# Patient Record
Sex: MALE | Race: OTHER | NOT HISPANIC OR LATINO
[De-identification: names, ages, dates, MRNs, and addresses within clinical notes are randomized per-mention and may not be internally consistent; named-entity substitution may affect disease eponyms.]

---

## 2017-11-29 PROBLEM — Z00.00 ENCOUNTER FOR PREVENTIVE HEALTH EXAMINATION: Status: ACTIVE | Noted: 2017-11-29

## 2018-10-03 ENCOUNTER — APPOINTMENT (OUTPATIENT)
Dept: ENDOCRINOLOGY | Facility: CLINIC | Age: 63
End: 2018-10-03

## 2020-05-31 ENCOUNTER — EMERGENCY (EMERGENCY)
Facility: HOSPITAL | Age: 65
LOS: 1 days | Discharge: HOME | End: 2020-05-31
Attending: EMERGENCY MEDICINE
Payer: COMMERCIAL

## 2020-05-31 VITALS
OXYGEN SATURATION: 98 % | RESPIRATION RATE: 18 BRPM | HEART RATE: 64 BPM | TEMPERATURE: 98 F | DIASTOLIC BLOOD PRESSURE: 84 MMHG | SYSTOLIC BLOOD PRESSURE: 165 MMHG | HEIGHT: 65 IN | WEIGHT: 179.9 LBS

## 2020-05-31 PROCEDURE — 99284 EMERGENCY DEPT VISIT MOD MDM: CPT

## 2020-05-31 RX ORDER — KETOROLAC TROMETHAMINE 30 MG/ML
30 SYRINGE (ML) INJECTION ONCE
Refills: 0 | Status: DISCONTINUED | OUTPATIENT
Start: 2020-05-31 | End: 2020-05-31

## 2020-05-31 RX ORDER — LIDOCAINE 4 G/100G
1 CREAM TOPICAL ONCE
Refills: 0 | Status: COMPLETED | OUTPATIENT
Start: 2020-05-31 | End: 2020-05-31

## 2020-05-31 RX ORDER — DIAZEPAM 5 MG
5 TABLET ORAL ONCE
Refills: 0 | Status: DISCONTINUED | OUTPATIENT
Start: 2020-05-31 | End: 2020-05-31

## 2020-05-31 RX ORDER — CYCLOBENZAPRINE HYDROCHLORIDE 10 MG/1
1 TABLET, FILM COATED ORAL
Qty: 30 | Refills: 0
Start: 2020-05-31

## 2020-05-31 RX ORDER — DEXAMETHASONE 0.5 MG/5ML
10 ELIXIR ORAL ONCE
Refills: 0 | Status: COMPLETED | OUTPATIENT
Start: 2020-05-31 | End: 2020-05-31

## 2020-05-31 RX ORDER — DIAZEPAM 5 MG
2 TABLET ORAL ONCE
Refills: 0 | Status: DISCONTINUED | OUTPATIENT
Start: 2020-05-31 | End: 2020-05-31

## 2020-05-31 RX ORDER — MORPHINE SULFATE 50 MG/1
4 CAPSULE, EXTENDED RELEASE ORAL ONCE
Refills: 0 | Status: DISCONTINUED | OUTPATIENT
Start: 2020-05-31 | End: 2020-05-31

## 2020-05-31 RX ADMIN — Medication 30 MILLIGRAM(S): at 22:05

## 2020-05-31 RX ADMIN — Medication 5 MILLIGRAM(S): at 22:08

## 2020-05-31 RX ADMIN — Medication 5 MILLIGRAM(S): at 23:06

## 2020-05-31 RX ADMIN — Medication 10 MILLIGRAM(S): at 22:05

## 2020-05-31 RX ADMIN — MORPHINE SULFATE 4 MILLIGRAM(S): 50 CAPSULE, EXTENDED RELEASE ORAL at 23:06

## 2020-05-31 RX ADMIN — LIDOCAINE 1 PATCH: 4 CREAM TOPICAL at 22:05

## 2020-05-31 NOTE — ED ADULT TRIAGE NOTE - CHIEF COMPLAINT QUOTE
pt stated when he was cleaning a plastic container and when he bend down  to  plastic container he hurt his back

## 2020-05-31 NOTE — ED PROVIDER NOTE - OBJECTIVE STATEMENT
61 y/o M patient with a significant PMHx of herniated disk and no significant PSHx presents to the ED with c/o R sided back pain after cleaning something and leaned over at work. Patient denies any urination, defection, or any other complains. Patient states no defection due to pain.

## 2020-05-31 NOTE — ED PROVIDER NOTE - PATIENT PORTAL LINK FT
You can access the FollowMyHealth Patient Portal offered by [facility] by registering at the following website: [email or web address]. By joining [employer]’s FollowMyHealth portal, you will also be able to view your health information using other applications (apps) compatible with our system.

## 2020-05-31 NOTE — ED PROVIDER NOTE - PROGRESS NOTE DETAILS
Olvera:  Pt received in signout, pending re-evaluation for Pt to feel improvement and then discharge with suspected lumbar strain and spasm.  I reassessed and Pt reports no significant improvement and still c/o spasm, especially with attempt to sit up or stand up from recumbent position.  No weakness/numbness, no other symptoms.  Will give IM Valium and reassess. Olvera:  Pt given IM Valium and showed some improvement.  He walks independently and no neuro deficits.  Has signs of sciatica.  Will prescribe pain med and muscle relaxants.  Advised strict return precautions and PMD f/u.

## 2020-05-31 NOTE — ED PROVIDER NOTE - CPE EDP GASTRO NORM
Pharmacist Discharge Medication Reconciliation Discharging Provider: [facility] Significant PMH:  
Past Medical History:  
Diagnosis Date  CAD (coronary artery disease) s/p CABG 2008  Carotid arterial disease ([facility]  CKD (chronic kidney disease) stage 3, GFR 30-59 ml/min ([facility]) 10/21/2017  
 hypertension and DM nephrosclerosis  Diabetes mellitus, type 2 ([facility]  Hypercholesteremia  Hypertension  Paroxysmal atrial fibrillation ([facility] 10/21/2017  
 new onset afib with BRPR 10-19-17 admit  PVC's (premature ventricular contractions) 5/26/2014  PVD (peripheral vascular disease) ([facility] Chief Complaint for this Admission: Chief Complaint Patient presents with  Shortness of Breath Allergies: Lisinopril Discharge Medications:  
Discharge Medication List as of 12/10/2019  5:44 PM  
  
 
START taking these medications Details  
spironolactone (ALDACTONE) 25 mg tablet Take 1 Tab by mouth daily. , Normal, Disp-20 Tab, R-0  
  
magnesium oxide (MAG-OX) 400 mg tablet Take 1 Tab by mouth daily. , Normal, Disp-15 Tab, R-0  
  
isosorbide dinitrate (ISORDIL) 20 mg tablet Take 1 Tab by mouth three (3) times daily. , Normal, Disp-60 Tab, R-0  
  
  
 
CONTINUE these medications which have CHANGED Details  
bumetanide (BUMEX) 1 mg tablet Take 3 Tabs by mouth two (2) times a day., Normal, Disp-120 Tab, R-0  
  
hydrALAZINE (APRESOLINE) 50 mg tablet Take 1 Tab by mouth three (3) times daily. , Normal, Disp-60 Tab, R-0  
  
insulin glargine (LANTUS U-100 INSULIN) 100 unit/mL injection 7 Units by SubCUTAneous route every morning., No Print, Disp-1 Vial, R-0  
  
  
 
CONTINUE these medications which have NOT CHANGED Details  
ferrous sulfate (IRON) 325 mg (65 mg iron) tablet Take 325 mg by mouth Daily (before breakfast). , Historical Med  
  
carvedilol (COREG) 12.5 mg tablet Take 12.5 mg by mouth two (2) times daily (with meals). , Historical Med  
  
simvastatin (ZOCOR) 20 mg tablet Take 1 Tab by mouth nightly., Normal, Disp-90 Tab, R-3  
  
albuterol (PROVENTIL VENTOLIN) 2.5 mg /3 mL (0.083 %) nebulizer solution 3 mL by Nebulization route every four (4) hours as needed for Wheezing., Normal, Disp-1 Package, R-0  
  
tamsulosin (FLOMAX) 0.4 mg capsule Take 1 Cap by mouth daily. , Print, Disp-30 Cap, R-0  
  
aspirin 81 mg chewable tablet Take 1 Tab by mouth daily. , Normal, Disp-33 Tab, R-11  
  
omega 3-dha-epa-fish oil (FISH OIL) 100-160-1,000 mg cap Take 1 Cap by mouth two (2) times a day., Historical Med  
  
cinnamon bark (CINNAMON) 500 mg cap Take 1,000 mg by mouth two (2) times a day., Historical Med  
  
  
 
STOP taking these medications  
  
 sodium bicarbonate 650 mg tablet Comments:  
Reason for Stopping:   
   
  
 
 
The patient's chart, MAR and AVS were reviewed by David Au, PHARMD. 
 normal...

## 2020-05-31 NOTE — ED PROVIDER NOTE - NSFOLLOWUPINSTRUCTIONS_ED_ALL_ED_FT
Back Pain    Back pain is very common in adults. The cause of back pain is rarely dangerous and the pain often gets better over time. The cause of your back pain may not be known and may include strain of muscles or ligaments, degeneration of the spinal disks, or arthritis. Occasionally the pain may radiate down your leg(s). Over-the-counter medicines to reduce pain and inflammation are often the most helpful. Stretching and remaining active frequently helps the healing process.     SEEK IMMEDIATE MEDICAL CARE IF YOU HAVE ANY OF THE FOLLOWING SYMPTOMS: bowel or bladder control problems, unusual weakness or numbness in your arms or legs, nausea or vomiting, abdominal pain, fever, dizziness/lightheadedness.      1. NAPROXEN 500 mg morning and night   2. CYCLOBEZAPRINE (Flexeril) 10 mg 1, 2 or 3 times a day for muscle spasms  3. Over the counter SALONPAS SPRAY or LIDOCAINE patches  4. Get a massage (or two)  5. Try hot compresses, showers and baths  6. In 1-2 weeks, start back pain stretches from [employer]  7. In 1-2 months, start back pain strengthening exercises from [employer] Or see your regular doctor to get a referral for PHYSICAL THERAPY  8. If pain persists for 6 weeks, see your doctor for x-rays  9. IF  you get fevers or neurologic deficits - decreased sensation, weakness, tingling in your arms or legs OR  trouble urinating, weight loss or night sweats, then RETURN to the ER or see your doctor ASAP

## 2020-05-31 NOTE — ED PROVIDER NOTE - NS ED MD EM SELECTION
Vaccine Information Statement(s) for was given today. This has been reviewed, questions answered, and verbal consent given by Parent for injection(s) and administration of Multi Vaccines between birth and 6 months.        Patient tolerated without incident. See immunization grid for documentation.     02221 Detailed 64443 Comprehensive

## 2020-05-31 NOTE — ED PROVIDER NOTE - CARE PLAN
Principal Discharge DX:	Back pain Principal Discharge DX:	Back pain  Secondary Diagnosis:	Sciatica of right side

## 2020-05-31 NOTE — ED ADULT NURSE NOTE - NSIMPLEMENTINTERV_GEN_ALL_ED
Implemented All Universal Safety Interventions:  Champlain to call system. Call bell, personal items and telephone within reach. Instruct patient to call for assistance. Room bathroom lighting operational. Non-slip footwear when patient is off stretcher. Physically safe environment: no spills, clutter or unnecessary equipment. Stretcher in lowest position, wheels locked, appropriate side rails in place.

## 2020-06-01 VITALS
OXYGEN SATURATION: 97 % | HEART RATE: 66 BPM | TEMPERATURE: 98 F | DIASTOLIC BLOOD PRESSURE: 68 MMHG | SYSTOLIC BLOOD PRESSURE: 125 MMHG | RESPIRATION RATE: 17 BRPM

## 2020-06-01 LAB
APPEARANCE UR: CLEAR — SIGNIFICANT CHANGE UP
BILIRUB UR-MCNC: NEGATIVE — SIGNIFICANT CHANGE UP
COLOR SPEC: YELLOW — SIGNIFICANT CHANGE UP
DIFF PNL FLD: NEGATIVE — SIGNIFICANT CHANGE UP
GLUCOSE UR QL: NEGATIVE — SIGNIFICANT CHANGE UP
KETONES UR-MCNC: NEGATIVE — SIGNIFICANT CHANGE UP
LEUKOCYTE ESTERASE UR-ACNC: NEGATIVE — SIGNIFICANT CHANGE UP
NITRITE UR-MCNC: NEGATIVE — SIGNIFICANT CHANGE UP
PH UR: 6.5 — SIGNIFICANT CHANGE UP (ref 5–8)
PROT UR-MCNC: NEGATIVE — SIGNIFICANT CHANGE UP
SP GR SPEC: 1.01 — SIGNIFICANT CHANGE UP (ref 1.01–1.02)
UROBILINOGEN FLD QL: NEGATIVE — SIGNIFICANT CHANGE UP

## 2020-06-01 PROCEDURE — 99284 EMERGENCY DEPT VISIT MOD MDM: CPT | Mod: 25

## 2020-06-01 PROCEDURE — 72100 X-RAY EXAM L-S SPINE 2/3 VWS: CPT

## 2020-06-01 PROCEDURE — 81003 URINALYSIS AUTO W/O SCOPE: CPT

## 2020-06-01 PROCEDURE — 72100 X-RAY EXAM L-S SPINE 2/3 VWS: CPT | Mod: 26

## 2020-06-01 PROCEDURE — 96372 THER/PROPH/DIAG INJ SC/IM: CPT | Mod: XU

## 2020-06-01 RX ORDER — KETOROLAC TROMETHAMINE 30 MG/ML
30 SYRINGE (ML) INJECTION ONCE
Refills: 0 | Status: DISCONTINUED | OUTPATIENT
Start: 2020-06-01 | End: 2020-06-01

## 2020-06-01 RX ORDER — CYCLOBENZAPRINE HYDROCHLORIDE 10 MG/1
1 TABLET, FILM COATED ORAL
Qty: 20 | Refills: 0
Start: 2020-06-01

## 2020-06-01 RX ORDER — LIDOCAINE 4 G/100G
1 CREAM TOPICAL
Qty: 7 | Refills: 0
Start: 2020-06-01

## 2020-06-01 RX ORDER — IBUPROFEN 200 MG
1 TABLET ORAL
Qty: 15 | Refills: 0
Start: 2020-06-01

## 2020-06-01 RX ADMIN — Medication 2 MILLIGRAM(S): at 00:26

## 2020-06-01 RX ADMIN — Medication 30 MILLIGRAM(S): at 01:02

## 2021-03-17 RX ORDER — POVIDONE-IODINE 5 %
1 AEROSOL (ML) TOPICAL ONCE
Refills: 0 | Status: COMPLETED | OUTPATIENT
Start: 2021-04-13 | End: 2021-04-13

## 2021-03-17 RX ORDER — INFLUENZA VIRUS VACCINE 15; 15; 15; 15 UG/.5ML; UG/.5ML; UG/.5ML; UG/.5ML
0.5 SUSPENSION INTRAMUSCULAR ONCE
Refills: 0 | Status: DISCONTINUED | OUTPATIENT
Start: 2021-04-13 | End: 2021-04-16

## 2021-03-17 NOTE — H&P ADULT - NSHPLABSRESULTS_GEN_ALL_CORE
3M DOS Povidone iodine nasal swab to be given day of surgery  covid pcr negative on 4/11/21 Povidone iodine nasal swab to be given day of surgery  covid pcr negative on 4/11/21  Cr: 0.85  Preop CBC, BMP, PT/PTT/INR, UA within normal limits- reviewed by medical clearance.   Preop EKG: NSR, reviewed by medical clearance.   CXR: Within normal limits, per medical clearance.   Spirometry: WNL per medical clearance

## 2021-03-17 NOTE — H&P ADULT - HISTORY OF PRESENT ILLNESS
63M c/o low back pain x     Present for elective Lami/PSF L5-S1  63M c/o low back pain x 1 year since work related injury. Patient notes low back pain has worsened since onset. Notes stiffness, pain radiating into his left side. Patient denies use of a cane/walker for assistance, however notes ability to walk more than short distances is limited 2/2 pain. Patient reports failure of conservative management including oral analgesics, activity modification. Denies h/o back surgeries. Denies hospitalization or use of abx in the last 3 months. Present for elective Lami/PSF L5-S1 with Dr. Garcia.

## 2021-03-17 NOTE — H&P ADULT - NSHPPHYSICALEXAM_GEN_ALL_CORE
MSK: + Decreased ROM 2/2 pain, lumbosacral spine       Remainder of exam per medical clearance note Gen: 64 y/o male, well nourished, well developed, NAD  MSK: Decreased ROM secondary to pain at the lumbar spine   calves soft, nontender bilaterally   sensation intact to light touch bilateral lower extremities  DP 2+,  brisk capillary refill  EHL/FHL/TA/GS 5/5 bilaterally     Remainder of exam per medical clearance note

## 2021-03-17 NOTE — H&P ADULT - PROBLEM SELECTOR PLAN 1
Admit to Orthopaedic Service.  Presents today for elective Lami/PSF L5-S1  Pt medically stable and cleared for procedure today by  Admit to Orthopaedic Service.  Presents today for elective Lami/PSF L5-S1  Pt medically stable and cleared for procedure today by Dr. Peterson

## 2021-03-29 ENCOUNTER — INPATIENT (INPATIENT)
Facility: HOSPITAL | Age: 66
LOS: 2 days | Discharge: HOME | DRG: 24 | End: 2021-04-01
Attending: PSYCHIATRY & NEUROLOGY | Admitting: PSYCHIATRY & NEUROLOGY
Payer: COMMERCIAL

## 2021-03-29 VITALS — TEMPERATURE: 98 F

## 2021-03-29 DIAGNOSIS — N40.0 BENIGN PROSTATIC HYPERPLASIA WITHOUT LOWER URINARY TRACT SYMPTOMS: ICD-10-CM

## 2021-03-29 DIAGNOSIS — Z98.890 OTHER SPECIFIED POSTPROCEDURAL STATES: Chronic | ICD-10-CM

## 2021-03-29 DIAGNOSIS — I63.9 CEREBRAL INFARCTION, UNSPECIFIED: ICD-10-CM

## 2021-03-29 DIAGNOSIS — I48.91 UNSPECIFIED ATRIAL FIBRILLATION: ICD-10-CM

## 2021-03-29 LAB
A1C WITH ESTIMATED AVERAGE GLUCOSE RESULT: 6.1 % — HIGH (ref 4–5.6)
ALBUMIN SERPL ELPH-MCNC: 4 G/DL — SIGNIFICANT CHANGE UP (ref 3.3–5)
ALP SERPL-CCNC: 43 U/L — SIGNIFICANT CHANGE UP (ref 40–120)
ALT FLD-CCNC: 19 U/L — SIGNIFICANT CHANGE UP (ref 10–45)
ANION GAP SERPL CALC-SCNC: 13 MMOL/L — SIGNIFICANT CHANGE UP (ref 5–17)
ANION GAP SERPL CALC-SCNC: 8 MMOL/L — SIGNIFICANT CHANGE UP (ref 5–17)
APTT BLD: 25.4 SEC — LOW (ref 27.5–35.5)
AST SERPL-CCNC: 22 U/L — SIGNIFICANT CHANGE UP (ref 10–40)
BASOPHILS # BLD AUTO: 0.08 K/UL — SIGNIFICANT CHANGE UP (ref 0–0.2)
BASOPHILS NFR BLD AUTO: 1.1 % — SIGNIFICANT CHANGE UP (ref 0–2)
BILIRUB SERPL-MCNC: 0.2 MG/DL — SIGNIFICANT CHANGE UP (ref 0.2–1.2)
BLD GP AB SCN SERPL QL: NEGATIVE — SIGNIFICANT CHANGE UP
BUN SERPL-MCNC: 25 MG/DL — HIGH (ref 7–23)
BUN SERPL-MCNC: 25 MG/DL — HIGH (ref 7–23)
CALCIUM SERPL-MCNC: 8.8 MG/DL — SIGNIFICANT CHANGE UP (ref 8.4–10.5)
CALCIUM SERPL-MCNC: 9.2 MG/DL — SIGNIFICANT CHANGE UP (ref 8.4–10.5)
CHLORIDE SERPL-SCNC: 106 MMOL/L — SIGNIFICANT CHANGE UP (ref 96–108)
CHLORIDE SERPL-SCNC: 106 MMOL/L — SIGNIFICANT CHANGE UP (ref 96–108)
CHOLEST SERPL-MCNC: 168 MG/DL — SIGNIFICANT CHANGE UP
CO2 SERPL-SCNC: 21 MMOL/L — LOW (ref 22–31)
CO2 SERPL-SCNC: 26 MMOL/L — SIGNIFICANT CHANGE UP (ref 22–31)
CREAT SERPL-MCNC: 1.15 MG/DL — SIGNIFICANT CHANGE UP (ref 0.5–1.3)
CREAT SERPL-MCNC: 1.51 MG/DL — HIGH (ref 0.5–1.3)
EOSINOPHIL # BLD AUTO: 0.33 K/UL — SIGNIFICANT CHANGE UP (ref 0–0.5)
EOSINOPHIL NFR BLD AUTO: 4.6 % — SIGNIFICANT CHANGE UP (ref 0–6)
ESTIMATED AVERAGE GLUCOSE: 128 MG/DL — HIGH (ref 68–114)
GLUCOSE BLDC GLUCOMTR-MCNC: 115 MG/DL — HIGH (ref 70–99)
GLUCOSE BLDC GLUCOMTR-MCNC: 160 MG/DL — HIGH (ref 70–99)
GLUCOSE SERPL-MCNC: 138 MG/DL — HIGH (ref 70–99)
GLUCOSE SERPL-MCNC: 149 MG/DL — HIGH (ref 70–99)
HCT VFR BLD CALC: 37.7 % — LOW (ref 39–50)
HCT VFR BLD CALC: 38 % — LOW (ref 39–50)
HDLC SERPL-MCNC: 59 MG/DL — SIGNIFICANT CHANGE UP
HGB BLD-MCNC: 12.2 G/DL — LOW (ref 13–17)
HGB BLD-MCNC: 12.5 G/DL — LOW (ref 13–17)
IMM GRANULOCYTES NFR BLD AUTO: 0.6 % — SIGNIFICANT CHANGE UP (ref 0–1.5)
INR BLD: 0.91 — SIGNIFICANT CHANGE UP (ref 0.88–1.16)
LIPID PNL WITH DIRECT LDL SERPL: 91 MG/DL — SIGNIFICANT CHANGE UP
LYMPHOCYTES # BLD AUTO: 3.22 K/UL — SIGNIFICANT CHANGE UP (ref 1–3.3)
LYMPHOCYTES # BLD AUTO: 45 % — HIGH (ref 13–44)
MAGNESIUM SERPL-MCNC: 1.9 MG/DL — SIGNIFICANT CHANGE UP (ref 1.6–2.6)
MCHC RBC-ENTMCNC: 30 PG — SIGNIFICANT CHANGE UP (ref 27–34)
MCHC RBC-ENTMCNC: 30.5 PG — SIGNIFICANT CHANGE UP (ref 27–34)
MCHC RBC-ENTMCNC: 32.4 GM/DL — SIGNIFICANT CHANGE UP (ref 32–36)
MCHC RBC-ENTMCNC: 32.9 GM/DL — SIGNIFICANT CHANGE UP (ref 32–36)
MCV RBC AUTO: 92.6 FL — SIGNIFICANT CHANGE UP (ref 80–100)
MCV RBC AUTO: 92.7 FL — SIGNIFICANT CHANGE UP (ref 80–100)
MONOCYTES # BLD AUTO: 0.64 K/UL — SIGNIFICANT CHANGE UP (ref 0–0.9)
MONOCYTES NFR BLD AUTO: 9 % — SIGNIFICANT CHANGE UP (ref 2–14)
NEUTROPHILS # BLD AUTO: 2.84 K/UL — SIGNIFICANT CHANGE UP (ref 1.8–7.4)
NEUTROPHILS NFR BLD AUTO: 39.7 % — LOW (ref 43–77)
NON HDL CHOLESTEROL: 109 MG/DL — SIGNIFICANT CHANGE UP
NRBC # BLD: 0 /100 WBCS — SIGNIFICANT CHANGE UP (ref 0–0)
NRBC # BLD: 0 /100 WBCS — SIGNIFICANT CHANGE UP (ref 0–0)
PHOSPHATE SERPL-MCNC: 2.7 MG/DL — SIGNIFICANT CHANGE UP (ref 2.5–4.5)
PLATELET # BLD AUTO: 186 K/UL — SIGNIFICANT CHANGE UP (ref 150–400)
PLATELET # BLD AUTO: 200 K/UL — SIGNIFICANT CHANGE UP (ref 150–400)
POTASSIUM SERPL-MCNC: 4 MMOL/L — SIGNIFICANT CHANGE UP (ref 3.5–5.3)
POTASSIUM SERPL-MCNC: 4.2 MMOL/L — SIGNIFICANT CHANGE UP (ref 3.5–5.3)
POTASSIUM SERPL-SCNC: 4 MMOL/L — SIGNIFICANT CHANGE UP (ref 3.5–5.3)
POTASSIUM SERPL-SCNC: 4.2 MMOL/L — SIGNIFICANT CHANGE UP (ref 3.5–5.3)
PROT SERPL-MCNC: 5.9 G/DL — LOW (ref 6–8.3)
PROTHROM AB SERPL-ACNC: 11 SEC — SIGNIFICANT CHANGE UP (ref 10.6–13.6)
RBC # BLD: 4.07 M/UL — LOW (ref 4.2–5.8)
RBC # BLD: 4.1 M/UL — LOW (ref 4.2–5.8)
RBC # FLD: 13.1 % — SIGNIFICANT CHANGE UP (ref 10.3–14.5)
RBC # FLD: 13.1 % — SIGNIFICANT CHANGE UP (ref 10.3–14.5)
RH IG SCN BLD-IMP: NEGATIVE — SIGNIFICANT CHANGE UP
SARS-COV-2 RNA SPEC QL NAA+PROBE: SIGNIFICANT CHANGE UP
SODIUM SERPL-SCNC: 140 MMOL/L — SIGNIFICANT CHANGE UP (ref 135–145)
SODIUM SERPL-SCNC: 140 MMOL/L — SIGNIFICANT CHANGE UP (ref 135–145)
TRIGL SERPL-MCNC: 91 MG/DL — SIGNIFICANT CHANGE UP
TROPONIN T SERPL-MCNC: <0.01 NG/ML — SIGNIFICANT CHANGE UP (ref 0–0.01)
TSH SERPL-MCNC: 2.23 UIU/ML — SIGNIFICANT CHANGE UP (ref 0.35–4.94)
WBC # BLD: 7.15 K/UL — SIGNIFICANT CHANGE UP (ref 3.8–10.5)
WBC # BLD: 8.54 K/UL — SIGNIFICANT CHANGE UP (ref 3.8–10.5)
WBC # FLD AUTO: 7.15 K/UL — SIGNIFICANT CHANGE UP (ref 3.8–10.5)
WBC # FLD AUTO: 8.54 K/UL — SIGNIFICANT CHANGE UP (ref 3.8–10.5)

## 2021-03-29 PROCEDURE — 70498 CT ANGIOGRAPHY NECK: CPT | Mod: 26,MA

## 2021-03-29 PROCEDURE — 99222 1ST HOSP IP/OBS MODERATE 55: CPT

## 2021-03-29 PROCEDURE — 0042T: CPT

## 2021-03-29 PROCEDURE — 93010 ELECTROCARDIOGRAM REPORT: CPT

## 2021-03-29 PROCEDURE — 99291 CRITICAL CARE FIRST HOUR: CPT

## 2021-03-29 PROCEDURE — 61645 PERQ ART M-THROMBECT &/NFS: CPT | Mod: RT

## 2021-03-29 PROCEDURE — 93970 EXTREMITY STUDY: CPT | Mod: 26

## 2021-03-29 PROCEDURE — 70450 CT HEAD/BRAIN W/O DYE: CPT | Mod: 26,MA,59

## 2021-03-29 PROCEDURE — 70450 CT HEAD/BRAIN W/O DYE: CPT | Mod: 26

## 2021-03-29 PROCEDURE — 76377 3D RENDER W/INTRP POSTPROCES: CPT | Mod: 26

## 2021-03-29 PROCEDURE — 70496 CT ANGIOGRAPHY HEAD: CPT | Mod: 26,MA

## 2021-03-29 RX ORDER — SENNA PLUS 8.6 MG/1
2 TABLET ORAL AT BEDTIME
Refills: 0 | Status: DISCONTINUED | OUTPATIENT
Start: 2021-03-29 | End: 2021-04-01

## 2021-03-29 RX ORDER — GLUCAGON INJECTION, SOLUTION 0.5 MG/.1ML
1 INJECTION, SOLUTION SUBCUTANEOUS ONCE
Refills: 0 | Status: DISCONTINUED | OUTPATIENT
Start: 2021-03-29 | End: 2021-03-29

## 2021-03-29 RX ORDER — ATORVASTATIN CALCIUM 80 MG/1
80 TABLET, FILM COATED ORAL AT BEDTIME
Refills: 0 | Status: DISCONTINUED | OUTPATIENT
Start: 2021-03-29 | End: 2021-04-01

## 2021-03-29 RX ORDER — METOPROLOL TARTRATE 50 MG
12.5 TABLET ORAL EVERY 12 HOURS
Refills: 0 | Status: DISCONTINUED | OUTPATIENT
Start: 2021-03-29 | End: 2021-04-01

## 2021-03-29 RX ORDER — SODIUM CHLORIDE 9 MG/ML
1000 INJECTION, SOLUTION INTRAVENOUS
Refills: 0 | Status: DISCONTINUED | OUTPATIENT
Start: 2021-03-29 | End: 2021-03-29

## 2021-03-29 RX ORDER — ONDANSETRON 8 MG/1
4 TABLET, FILM COATED ORAL EVERY 8 HOURS
Refills: 0 | Status: DISCONTINUED | OUTPATIENT
Start: 2021-03-29 | End: 2021-04-01

## 2021-03-29 RX ORDER — SODIUM CHLORIDE 9 MG/ML
1000 INJECTION INTRAMUSCULAR; INTRAVENOUS; SUBCUTANEOUS
Refills: 0 | Status: DISCONTINUED | OUTPATIENT
Start: 2021-03-29 | End: 2021-03-29

## 2021-03-29 RX ORDER — JNJ-78436735 50000000000 [PFU]/.5ML
0.5 SUSPENSION INTRAMUSCULAR ONCE
Refills: 0 | Status: DISCONTINUED | OUTPATIENT
Start: 2021-03-29 | End: 2021-03-29

## 2021-03-29 RX ORDER — INSULIN LISPRO 100/ML
VIAL (ML) SUBCUTANEOUS
Refills: 0 | Status: DISCONTINUED | OUTPATIENT
Start: 2021-03-29 | End: 2021-03-29

## 2021-03-29 RX ORDER — ACETAMINOPHEN 500 MG
650 TABLET ORAL EVERY 6 HOURS
Refills: 0 | Status: DISCONTINUED | OUTPATIENT
Start: 2021-03-29 | End: 2021-04-01

## 2021-03-29 RX ORDER — ALTEPLASE 100 MG
6.7 KIT INTRAVENOUS ONCE
Refills: 0 | Status: COMPLETED | OUTPATIENT
Start: 2021-03-29 | End: 2021-03-29

## 2021-03-29 RX ORDER — ALTEPLASE 100 MG
60.3 KIT INTRAVENOUS ONCE
Refills: 0 | Status: COMPLETED | OUTPATIENT
Start: 2021-03-29 | End: 2021-03-29

## 2021-03-29 RX ADMIN — Medication 12.5 MILLIGRAM(S): at 17:08

## 2021-03-29 RX ADMIN — ALTEPLASE 402 MILLIGRAM(S): KIT at 02:23

## 2021-03-29 RX ADMIN — SENNA PLUS 2 TABLET(S): 8.6 TABLET ORAL at 21:44

## 2021-03-29 RX ADMIN — ALTEPLASE 60.3 MILLIGRAM(S): KIT at 02:23

## 2021-03-29 RX ADMIN — Medication 5 MILLIGRAM(S): at 21:44

## 2021-03-29 RX ADMIN — SODIUM CHLORIDE 100 MILLILITER(S): 9 INJECTION INTRAMUSCULAR; INTRAVENOUS; SUBCUTANEOUS at 05:59

## 2021-03-29 RX ADMIN — ATORVASTATIN CALCIUM 80 MILLIGRAM(S): 80 TABLET, FILM COATED ORAL at 21:44

## 2021-03-29 RX ADMIN — SODIUM CHLORIDE 100 MILLILITER(S): 9 INJECTION INTRAMUSCULAR; INTRAVENOUS; SUBCUTANEOUS at 10:24

## 2021-03-29 NOTE — ED PROVIDER NOTE - CLINICAL SUMMARY MEDICAL DECISION MAKING FREE TEXT BOX
Pt p/w L sided hemiparesis, slurred speech, L hemineglect within 4.5 hours of onset (last known normal 10pm). STroke code initiated within [facility] protocols. TPA initiated. NRS consulted for possible thrombectomy. ADmit to MICU Pt p/w L sided hemiparesis, slurred speech, L hemineglect within 4.5 hours of onset (last known normal 10pm). Stroke code initiated within [facility] protocols. TPA initiated in consultation w/ NRS. NIHSS 14, onset within 4.5 hours. No AC use. NRS consulted for possible thrombectomy. Admit to MICU

## 2021-03-29 NOTE — PROGRESS NOTE ADULT - SUBJECTIVE AND OBJECTIVE BOX
NEUROSURGERY POST OP NOTE:    POD# 0 S/P cerebral angiogram with right MCA mechanical thrombectomy    S: Patient reports doing well and denies any pain.    Hospital Course:  3/29 - Presented to ED in rapid Afib, collapsed at home, NIH 14 on stroke code. CTA with right m1 occlusion. Received TPA and taken for angiography and mechanical thrombectomy, admitted to NSICU post procedure.      T(C): 35.2 (03-29-21 @ 04:41), Max: 36.7 (03-29-21 @ 02:05)  HR: 57 (03-29-21 @ 06:00) (42 - 64)  BP: 100/63 (03-29-21 @ 06:00) (94/57 - 129/72)  RR: 18 (03-29-21 @ 06:00) (16 - 18)  SpO2: 100% (03-29-21 @ 06:00) (95% - 100%)      03-28-21 @ 07:01  -  03-29-21 @ 06:30  --------------------------------------------------------  IN: 200 mL / OUT: 0 mL / NET: 200 mL        acetaminophen   Tablet .. 650 milliGRAM(s) Oral every 6 hours PRN  atorvastatin 80 milliGRAM(s) Oral at bedtime  dextrose 5%. 1000 milliLiter(s) IV Continuous <Continuous>  glucagon  Injectable 1 milliGRAM(s) IntraMuscular once  insulin lispro (ADMELOG) corrective regimen sliding scale   SubCutaneous Before meals and at bedtime  ondansetron Injectable 4 milliGRAM(s) IV Push every 8 hours PRN  senna 2 Tablet(s) Oral at bedtime  sodium chloride 0.9%. 1000 milliLiter(s) IV Continuous <Continuous>      RADIOLOGY:     Exam:  Gen: NAD, AAOx3  HEENT: PERRL. EOMI.   Neck: FROM, nontender  Lungs: Clear b/l  Heart: S1, S2. NSR.  Abd: Soft, NT/ND. +BS  Exts: Pulses 2+ throughout, right groin site C/D/I.  Neuro: Left facial asymmetry, CNs II-XII otherwise intact. Left UE 4/5, o/w 5/5 in all extremities, +Left UE pronator drift. Speech clear. Sensation intact throughout. Following commands.  WOUND/DRAINS: as above                          12.5   7.15  )-----------( 200      ( 29 Mar 2021 02:16 )             38.0   03-29    140  |  106  |  25<H>  ----------------------------<  138<H>  4.0   |  26  |  1.51<H>    Ca    9.2      29 Mar 2021 02:16    TPro  5.9<L>  /  Alb  4.0  /  TBili  0.2  /  DBili  x   /  AST  22  /  ALT  19  /  AlkPhos  43  03-29      Assessment: 65y Male with Afib not on AC, BPH, with acute onset left sided weakness at 1:30am, presented to ED as stroke code, NIH 14, with right MCA M1 occlusion on CTA, s/p TPA and now s/p cerebral angiogram for mechanical thrombectomy 3/29/21, TICI 0 to TICI 3.      Plan:  Neuro:  q1 stroke neuro checks,  Tylenol PRN  Stroke neurology work-up, core measures as ordered  CT Head 24h post TPA ordered    Cardiovascular:  -150,  Daily labs, electrolyte repletion PRN  TTE w/ bubble  Statin therapy for core measures    Pulmonary:  Saturating well on RA    Renal:  Voiding, condom cath PRN  Family to provide home BPH meds,  IVF post angiogram    GI:  NPO until dysphagia trial,  Stool softeners PRN    ID:  Afebrile    Endo:  ISS, HGB A1C pending    DVT Prophylaxis:  SCDs  LE dopplers  Hold chemoppx at this time due to TPA    Disposition:  Continue NSICU care, full code,  PT/OT evaluation when clinically appropriate,  D/w Dr. Pisano, Dr. Mckeon      Assessment:  Present when checked    []  GCS  E   V  M     Heart Failure: []Acute, [] acute on chronic , []chronic  Heart Failure:  [] Diastolic (HFpEF), [] Systolic (HFrEF), []Combined (HFpEF and HFrEF), [] RHF, [] Pulm HTN, [] Other    [] LEEANNA, [] ATN, [] AIN, [] other  [] CKD1, [] CKD2, [] CKD 3, [] CKD 4, [] CKD 5, []ESRD    Encephalopathy: [] Metabolic, [] Hepatic, [] toxic, [] Neurological, [] Other    Abnormal Nurtitional Status: [] malnurtition (see nutrition note), [ ]underweight: BMI < 19, [] morbid obesity: BMI >40, [] Cachexia    [] Sepsis  [] hypovolemic shock,[] cardiogenic shock, [] hemorrhagic shock, [] neuogenic shock  [] Acute Respiratory Failure  []Cerebral edema, [] Brain compression/ herniation,   [] Functional quadriplegia  [] Acute blood loss anemia

## 2021-03-29 NOTE — ED PROVIDER NOTE - PHYSICAL EXAMINATION
Limited PE performed in the setting of the COVID10 pandemic, in efforts to limit exposure and cross-contamination  Constitutional: Well appearing, well nourished, awake, alert, oriented to person, place, time/situation and in no apparent distress.  ENMT: Airway patent.   Eyes: R gaze preference, EOMI, PERRL  Cardiac: bradycardic, regular rhythm.   Respiratory: No increased WOB, tachypnea, hypoxia, or accessory mm use. Pt speaks in full sentences.   Gastrointestinal: Abd soft, NT, ND. No guarding, rebound, or rigidity.   Musculoskeletal: Range of motion is not limited  Neuro: See NIHSS  Skin: Skin normal color for race, warm, dry and intact. No evidence of rash.  Psych: Alert and oriented to person, place, time/situation. normal mood and affect. no apparent risk to self or others.

## 2021-03-29 NOTE — BRIEF OPERATIVE NOTE - NSICDXBRIEFPROCEDURE_GEN_ALL_CORE_FT
PROCEDURES:  Angiogram, cerebral, with thrombectomy 29-Mar-2021 04:19:49 Right MCA thrombectomy Huan Don

## 2021-03-29 NOTE — CONSULT NOTE ADULT - ASSESSMENT
64 y/o male with PMH Afib (not on AC) and BPH who was BIBEMS for left sided weakness and left facial droop upon waking up at 1:30 AM on 3/29/21. NIHSS 14. CTH significant hyperdense material within the right M1 segment consistent with CTA findings of abrupt occlusion at the right M1 segment. CTP shows perfusion defect in the right MCA territory with a mismatch volume of 127 mL. tpa administered at 02:33 on 3/29/21. Neurosurgery consulted, patient taken for angiogram and underwent successful mechanical thrombectomy, TICI 0 to 3. Pt to be admitted to NSICU for close monitoring.     1) Post tpa Monitoring  - No antiplatelet or anticoagulation for 24 hours post tpa  - Obtain 24 hour post tpa HCT at 2:30 AM on 3/30/21  - Perform stroke neuro check every 15 minutes for first hour after infusion is stopped; every 30 minutes for the next 6 hours; hourly until 24 hours from end of infusion  - Keep BP <180/105  - Repeat CT head with any acute change in mental status  - Minimize arterial and venous punctures  - Maintain strict bed rest for 24 hours with HOB <30 degrees.     2) Stroke Management  - Start Atorvastatin 80 once daily once dysphagia screen passed  - Obtain Hemoglobin A1c, Lipid profile set, and TSH  - MR Brain w/o contrast  - Echo w/bubble  - PT/OT order  - DVT ppx - SCDs (No anticoagulation as post tPA protocol)

## 2021-03-29 NOTE — ED ADULT NURSE NOTE - OBJECTIVE STATEMENT
pt to ED for stroke like symptoms. well known time 10pm last night before going to bed, and pt fell off the bed around 1:30 to go to the restroom. unsure head strike, no n/v/d, dizziness, numbness, tingling, however pt LUE/LLE motor power 1 and speech is slurred. pt to ED for stroke like symptoms. well known time 10pm last night before going to bed, and pt fell off the bed around 1:30 to go to the restroom. unsure head strike, no n/v/d, dizziness, numbness, tingling, however speech is slurred, code stroke initiated.

## 2021-03-29 NOTE — ED ADULT NURSE NOTE - ED STAT RN HANDOFF DETAILS
report given to MAMADOU Mccarty in Cath lab. all documents (EKG, consent form for thrombectomy, admission adm) all sent to cath lab as well

## 2021-03-29 NOTE — PROGRESS NOTE ADULT - SUBJECTIVE AND OBJECTIVE BOX
=================================  NEUROCRITICAL CARE ATTENDING NOTE  =================================    PAULINE GLASGOW   MRN-[id number]  Summary:  65y/M with PMH Afib (not on AC) and BPH who was BIBEMS for fall caused by new onset left sided weakness since waking up at 1:30 AM this morning. Last known well 10:30 PM on 3/28. No head trauma or LOC.  As per wife at bedside, after falling, she noticed patient was weak on his left side, had a left facial droop and was slurring his speech. Upon arrival to the ED, patient's NIHSS 14. Pt denies headache, blurry vision, dizziness, chest pain, palpitations, abdominal pain, nausea, vomiting. Imaging showing high attenuating thrombus within the right M1 segment on the noncontrast CT head, upright occlusion of the right M1 branch on the CTA head and neck, and perfusion defect in the R MCA territory. TPA infusion started at 02:45 and patient now s/p mechanical thrombectomy for right M1 occlusion.    COURSE IN THE HOSPITAL:   admitted to [facility], s/p tPA, s/p thrombectomy    Past Medical History: BPH without urinary obstruction Atrial fibrillation  Allergies:  Compazine (Unknown)  Home meds:   ·	Kapspargo Sprinkle (metoprolol) 25 mg oral capsule, extended release: 1 cap(s) orally once a day    PHYSICAL EXAMINATION  T(C): 36.9 ( @ 18:05), Max: 37.1 ( @ 14:50) HR: 87 ( @ 21:00) (42 - 118) BP: 96/56 ( @ 08:00) (94/57 - 129/72) RR: 16 ( @ 21:00) (15 - 18) SpO2: 96% ( @ 21:00) (95% - 100%)  NEUROLOGIC EXAMINATION:  Patient is awake, alert, fully oriented, pupils 3mm equal and briskly reactive to light, EOMs intact, mild left facial droop, moves all 4s with good strength, L arm antigravity but (+) drift   GENERAL: not intubated, not in cardiorespiratory distress  EENT:  anicteric  CARDIOVASCULAR: (+) S1 S2, normal rate and regular rhythm  PULMONARY: clear to auscultation bilaterally  ABDOMEN: soft, nontender with normoactive bowel sounds  EXTREMITIES: no edema  SKIN: no rash    LABS:  CAPILLARY BLOOD GLUCOSE 160 115 130                         12.2   8.54  )-----------( 186      ( 29 Mar 2021 07:38 )             37.7     140  |  106  |  25<H>  ----------------------------<  149<H>  4.2   |  21<L>  |  1.15    Ca    8.8      29 Mar 2021 07:38  Phos  2.7       Mg     1.9         TPro  5.9<L>  /  Alb  4.0  /  TBili  0.2  /  DBili  x   /  AST  22  /  ALT  19  /  AlkPhos  43      HbA1C = 6.1 ()  LDL = 91 ()   HDL = 59 ()  TG = 91 ()   TSH = 2.235 ()     @ 07:  -   @ 07:00  IN: 300 mL / OUT: 300 mL / NET: 0 mL    Bacteriology:  CSF studies:  EEG:  Neuroimagin/29 CTA abrupt occlusion R M1 segment   CTP:  R MCA defect, mismatch 127ml   CT head: hyperdense R M1  Other imagin/29 LE Doppler: NEG    MEDICATIONS:  ·	acetaminophen   Tablet .. 650 milliGRAM(s) Oral every 6 hours PRN  ·	ondansetron Injectable 4 milliGRAM(s) IV Push every 8 hours PRN  ·	metoprolol tartrate 12.5 milliGRAM(s) Oral every 12 hours  ·	bisacodyl 5 milliGRAM(s) Oral at bedtime  ·	senna 2 Tablet(s) Oral at bedtime  ·	atorvastatin 80 milliGRAM(s) Oral at bedtime    IV FLUIDS: IVL  DRIPS:  DIET: regular   Lines:  Drains:    Wounds:    CODE STATUS:  Full Code                       GOALS OF CARE:  aggressive                      DISPOSITION:  ICU  NIHSS 14

## 2021-03-29 NOTE — ED PROVIDER NOTE - OBJECTIVE STATEMENT
Pt w/ PMHx AF on Metoprolol (no AC use), BPH, p/w L sided weakness, slurred speech, upon awakening at 2 am. Last known normal 10pm. Pt w/ PMHx AF on Metoprolol (no AC use), BPH, p/w L sided weakness, slurred speech, upon awakening at 2 am. Last known normal 10pm when going to sleep. Pt awoke at 2 am, stumbling attempting to get out of bed, awakening wife. Wife noted slurred speech and L sided weakness, prompting 911 call.

## 2021-03-29 NOTE — BRIEF OPERATIVE NOTE - OPERATION/FINDINGS
Under MAC, using an 0.88 Neuron max cath right CFA, one vessel cerebral angiogram was performed (Right CCA/ICA) Findings: Stump occlusion of right M1, mechanical thrombectomy using ACE 68 (aspiration catheter) and Trevo 4 mm x20 mm (Stent triever) TICI 0 to TICI 3  Full report to follow.  Patient tolerated procedure well, hemodynamically stable.  Right groin/vasc access site: Perclose and manual compression applied, hemostasis achieved, no hematoma.  X -Per CT was performed, no hemorrhage or large territorial infarct.  Patient was transferred to NSICU

## 2021-03-29 NOTE — PROGRESS NOTE ADULT - ASSESSMENT
64 y/o male with PMH Afib (not on AC due to low CHADS VASC score) presenting with R MCA syndrome and M1 occlusion, s/p thrombectomy with tici3 reperfusion.    Neuro)  stroke with known afib  q1h neurochecks  post tA monitoring, repeat CTH tonight at 1AM  stroke core measures - lipitor 80  Echo, lipid profile  A1c 6.1  Stroke mechanism liekly cardioembolic - will need full anticoagulation likely 1 week depending on core infarct size  lovenox ppx  and ASA after CTH tonight  SBP goal - permissive up to 180  Bedrest today s/p tPa, PT/OT tomorrow  Dysphqagai screen - diet today    CV)  Hold home toprol for 48h unless HR >100    Heme)  Lovenox after 24h scan 66 y/o male with PMH Afib (not on AC due to low CHADS VASC score) presenting with R MCA syndrome and M1 occlusion with relatively small core infarct and large mismatch territory.    s/p thrombectomy with tici3 reperfusion. Doing well after thrombectomy, relatively mild deficits.    Neuro)  stroke with known afib  q1h neurochecks  post tA monitoring, repeat CTH tonight at 1AM  stroke core measures - lipitor 80  Echo, lipid profile  A1c 6.1  Stroke mechanism liekly cardioembolic - will need full anticoagulation likely 1 week depending on core infarct size  lovenox ppx  and ASA after CTH tonight  SBP goal - permissive up to 180  Bedrest today s/p tPa, PT/OT tomorrow  Dysphqagai screen - diet today    CV)  Hold home toprol for 48h unless HR >100    Heme)  Lovenox after 24h scan    GI) Regular diet    Renal - normonatremia goal, urinating without issue    ID - [id number]

## 2021-03-29 NOTE — BRIEF OPERATIVE NOTE - NSICDXBRIEFPOSTOP_GEN_ALL_CORE_FT
POST-OP DIAGNOSIS:  Cerebrovascular accident (CVA) due to occlusion 29-Mar-2021 04:21:02 Complete recanalization of right MCA/M1 occlusion TICI 3 Huan Don

## 2021-03-29 NOTE — CONSULT NOTE ADULT - SUBJECTIVE AND OBJECTIVE BOX
**STROKE CODE CONSULT NOTE**    Last known well time: 10 PM on 3/28/21  Time of onset of symptoms: Woke up with symptoms around 1:30 AM on 3/29/21    HPI: 66 y/o male with PMH Afib (not on AC) and BPH who was BIBEMS for left sided weakness since waking up at 1:30 AM this morning. Pt states he woke up to use the restroom, when he slipped and fell. No head trauma or LOC. Pt states he had difficulty standing up afterwards and reports his wife called EMS. As per wife at bedside, after falling, she noticed patient was weak on his left side, had a left facial droop and was slurring his speech. Upon arrival to the ED, patient's NIHSS 14. Pt denies headache, dizziness, chest pain, palpitations,       T(C): 36.7 (03-29-21 @ 02:07), Max: 36.7 (03-29-21 @ 02:07)  HR: 51 (03-29-21 @ 02:33) (51 - 64)  BP: 96/61 (03-29-21 @ 02:33) (96/61 - 126/72)  RR: 16 (03-29-21 @ 02:33) (16 - 16)  SpO2: 100% (03-29-21 @ 02:33) (95% - 100%)    PAST MEDICAL & SURGICAL HISTORY:  Afib  BPH    FAMILY HISTORY:      SOCIAL HISTORY:    ROS: ***  Constitutional: No fever, weight loss or fatigue  Eyes: No eye pain, visual disturbances, or discharge  ENMT:  No difficulty hearing, tinnitus, vertigo; No sinus or throat pain  Neck: No pain or stiffness  Respiratory: No cough, wheezing, chills or hemoptysis  Cardiovascular: No chest pain, palpitations, shortness of breath, dizziness or leg swelling  Gastrointestinal: No abdominal pain. No nausea, vomiting or hematemesis; No diarrhea or constipation. Nohematochezia.  Genitourinary: No dysuria, frequency, hematuria or incontinence  Neurological: As per HPI  Skin: No itching, burning, rashes or lesions   Endocrine: No heat or cold intolerance; No hair loss  Musculoskeletal: No joint pain or swelling; No muscle, back or extremity pain  Psychiatric: No depression, anxiety, mood swings or difficulty sleeping  Heme/Lymph: No easy bruising or bleeding gums    MEDICATIONS  (STANDING):  alteplase    Bolus 6.7 milliGRAM(s) IV Bolus Once  alteplase    IVPB 60.3 milliGRAM(s) IV Intermittent Once  sodium chloride 0.9%. 1000 milliLiter(s) (100 mL/Hr) IV Continuous <Continuous>    MEDICATIONS  (PRN):    Allergies    Compazine (Unknown)    Intolerances      Vital Signs Last 24 Hrs  T(C): 36.7 (29 Mar 2021 02:07), Max: 36.7 (29 Mar 2021 02:07)  T(F): 98.1 (29 Mar 2021 02:07), Max: 98.1 (29 Mar 2021 02:07)  HR: 51 (29 Mar 2021 02:33) (51 - 64)  BP: 96/61 (29 Mar 2021 02:33) (96/61 - 126/72)  BP(mean): --  RR: 16 (29 Mar 2021 02:33) (16 - 16)  SpO2: 100% (29 Mar 2021 02:33) (95% - 100%)    Physical exam:  Constitutional: No acute distress, conversant  Eyes: Anicteric sclerae, moist conjunctivae, see below for CNs  Neck: trachea midline, FROM, supple, no thyromegaly or lymphadenopathy  Cardiovascular: Regular rate and rhythm, no murmurs, rubs, or gallops. No carotid bruits.   Pulmonary: Anterior breath sounds clear bilaterally, no crackles or wheezing. No use of accessory muscles  GI: Abdomen soft, non-distended, non-tender  Extremities: Radial and DP pulses +2, no edema    Neurologic:  -Mental status: Awake, alert, oriented to person, place, and time. Speech is fluent with intact naming, repetition, and comprehension, no dysarthria. Recent and remote memory intact. Follows commands. Attention/concentration intact. Fund of knowledge appropriate.  -Cranial nerves:   II: Visual fields are full to confrontation.  III, IV, VI: Extraocular movements are intact without nystagmus. Pupils equally round and reactive to light  V:  Facial sensation V1-V3 equal and intact   VII: Face is symmetric with normal eye closure and smile  VIII: Hearing is bilaterally intact to finger rub  IX, X: Uvula is midline and soft palate rises symmetrically  XI: Head turning and shoulder shrug are intact.  XII: Tongue protrudes midline  Motor: Normal bulk and tone. No pronator drift. Strength bilateral upper extremity 5/5, bilateral lower extremities 5/5.  Rapid alternating movements intact and symmetric  Sensation: Intact to light touch bilaterally. No neglect or extinction on double simultaneous testing.  Coordination: No dysmetria on finger-to-nose and heel-to-shin bilaterally  Reflexes: Downgoing toes bilaterally   Gait: Narrow gait and steady    NIHSS: **** ICH Score: ******    Fingerstick Blood Glucose: CAPILLARY BLOOD GLUCOSE  130 (29 Mar 2021 02:54)      POCT Blood Glucose.: 130 mg/dL (29 Mar 2021 02:06)    LABS:                        12.5   7.15  )-----------( 200      ( 29 Mar 2021 02:16 )             38.0     03-29    140  |  106  |  x   ----------------------------<  x   4.0   |  x   |  x         PT/INR - ( 29 Mar 2021 02:16 )   PT: 11.0 sec;   INR: 0.91          PTT - ( 29 Mar 2021 02:16 )  PTT:25.4 sec          RADIOLOGY & ADDITIONAL STUDIES:      -----------------------------------------------------------------------------------------------------------------  IV-tPA (Y/N):    ***                              Bolus time:  Reason IV-tPA not given: ***    **STROKE CODE CONSULT NOTE**    Last known well time: 10 PM on 3/28/21  Time of onset of symptoms: Woke up with symptoms around 1:30 AM on 3/29/21    HPI: 66 y/o male with PMH Afib (not on AC) and BPH who was BIBEMS for left sided weakness since waking up at 1:30 AM this morning. Pt states he woke up to use the restroom, when he slipped and fell. No head trauma or LOC. Pt states he had difficulty standing up afterwards and reports his wife called EMS. As per wife at bedside, after falling, she noticed patient was weak on his left side, had a left facial droop and was slurring his speech. Upon arrival to the ED, patient's NIHSS 14. Pt denies headache, blurry vision, dizziness, chest pain, palpitations, abdominal pain, nausea, vomiting. CTH shows       T(C): 36.7 (03-29-21 @ 02:07), Max: 36.7 (03-29-21 @ 02:07)  HR: 51 (03-29-21 @ 02:33) (51 - 64)  BP: 96/61 (03-29-21 @ 02:33) (96/61 - 126/72)  RR: 16 (03-29-21 @ 02:33) (16 - 16)  SpO2: 100% (03-29-21 @ 02:33) (95% - 100%)    PAST MEDICAL & SURGICAL HISTORY:  Afib  BPH    SOCIAL HISTORY:    ROS:   Constitutional: No fever, weight loss or fatigue  Eyes: No eye pain, visual disturbances, or discharge  ENMT:  No difficulty hearing, tinnitus, vertigo; No sinus or throat pain  Neck: No pain or stiffness  Respiratory: No cough, wheezing, chills or hemoptysis  Cardiovascular: No chest pain, palpitations, shortness of breath, dizziness or leg swelling  Gastrointestinal: No abdominal pain. No nausea, vomiting or hematemesis; No diarrhea or constipation. Nohematochezia.  Genitourinary: No dysuria, frequency, hematuria or incontinence  Neurological: As per HPI  Skin: No itching, burning, rashes or lesions   Musculoskeletal: No joint pain or swelling; No muscle, back or extremity pain      MEDICATIONS  (STANDING):  alteplase    Bolus 6.7 milliGRAM(s) IV Bolus Once  alteplase    IVPB 60.3 milliGRAM(s) IV Intermittent Once  sodium chloride 0.9%. 1000 milliLiter(s) (100 mL/Hr) IV Continuous <Continuous>    Allergies  Compazine (Unknown)    Vital Signs Last 24 Hrs  T(C): 36.7 (29 Mar 2021 02:07), Max: 36.7 (29 Mar 2021 02:07)  T(F): 98.1 (29 Mar 2021 02:07), Max: 98.1 (29 Mar 2021 02:07)  HR: 51 (29 Mar 2021 02:33) (51 - 64)  BP: 96/61 (29 Mar 2021 02:33) (96/61 - 126/72)  BP(mean): --  RR: 16 (29 Mar 2021 02:33) (16 - 16)  SpO2: 100% (29 Mar 2021 02:33) (95% - 100%)    Physical exam:  Constitutional: No acute distress, conversant  Eyes: Anicteric sclerae, moist conjunctivae, see below for CNs  Neck: trachea midline, FROM, supple, no thyromegaly or lymphadenopathy  Cardiovascular: Regular rate and rhythm, no murmurs, rubs, or gallops. No carotid bruits.   Pulmonary: Anterior breath sounds clear bilaterally, no crackles or wheezing. No use of accessory muscles  GI: Abdomen soft, non-distended, non-tender  Extremities: Radial and DP pulses +2, no edema    Neurologic:  -Mental status: Awake, alert, oriented to person, place, and time. Speech is fluent with intact naming, repetition, and comprehension, no dysarthria. Recent and remote memory intact. Follows commands. Attention/concentration intact. Fund of knowledge appropriate.  -Cranial nerves:   II: Visual fields are full to confrontation.  III, IV, VI: Extraocular movements are intact without nystagmus. Pupils equally round and reactive to light  V:  Facial sensation V1-V3 equal and intact   VII: Face is symmetric with normal eye closure and smile  VIII: Hearing is bilaterally intact to finger rub  IX, X: Uvula is midline and soft palate rises symmetrically  XI: Head turning and shoulder shrug are intact.  XII: Tongue protrudes midline  Motor: Normal bulk and tone. No pronator drift. Strength bilateral upper extremity 5/5, bilateral lower extremities 5/5.  Rapid alternating movements intact and symmetric  Sensation: Intact to light touch bilaterally. No neglect or extinction on double simultaneous testing.  Coordination: No dysmetria on finger-to-nose and heel-to-shin bilaterally  Reflexes: Downgoing toes bilaterally   Gait: Narrow gait and steady    NIHSS: **** ICH Score: ******    Fingerstick Blood Glucose: CAPILLARY BLOOD GLUCOSE  130 (29 Mar 2021 02:54)      POCT Blood Glucose.: 130 mg/dL (29 Mar 2021 02:06)    LABS:                        12.5   7.15  )-----------( 200      ( 29 Mar 2021 02:16 )             38.0     03-29    140  |  106  |  x   ----------------------------<  x   4.0   |  x   |  x         PT/INR - ( 29 Mar 2021 02:16 )   PT: 11.0 sec;   INR: 0.91          PTT - ( 29 Mar 2021 02:16 )  PTT:25.4 sec          RADIOLOGY & ADDITIONAL STUDIES:      -----------------------------------------------------------------------------------------------------------------  IV-tPA (Y/N):    ***                              Bolus time:  Reason IV-tPA not given: ***    **STROKE CODE CONSULT NOTE**    Last known well time: 10 PM on 3/28/21  Time of onset of symptoms: Woke up with symptoms around 1:30 AM on 3/29/21    HPI: 64 y/o male with PMH Afib (not on AC) and BPH who was BIBEMS for left sided weakness since waking up at 1:30 AM this morning. Pt states he woke up to use the restroom, when he slipped and fell. No head trauma or LOC. Pt states he had difficulty standing up afterwards and reports his wife called EMS. As per wife at bedside, after falling, she noticed patient was weak on his left side, had a left facial droop and was slurring his speech. Upon arrival to the ED, patient's NIHSS 14. Pt denies headache, blurry vision, dizziness, chest pain, palpitations, abdominal pain, nausea, vomiting. CTH shows       T(C): 36.7 (03-29-21 @ 02:07), Max: 36.7 (03-29-21 @ 02:07)  HR: 51 (03-29-21 @ 02:33) (51 - 64)  BP: 96/61 (03-29-21 @ 02:33) (96/61 - 126/72)  RR: 16 (03-29-21 @ 02:33) (16 - 16)  SpO2: 100% (03-29-21 @ 02:33) (95% - 100%)    PAST MEDICAL & SURGICAL HISTORY:  Afib  BPH    SOCIAL HISTORY:    ROS:   Constitutional: No fever, weight loss or fatigue  Eyes: No eye pain or discharge  ENMT:  No difficulty hearing; No sinus or throat pain  Neck: No pain or stiffness  Respiratory: No cough, wheezing, chills or hemoptysis  Cardiovascular: No shortness of breath or leg swelling  Gastrointestinal: No diarrhea or constipation.  Genitourinary: No dysuria, frequency, hematuria or incontinence  Neurological: As per HPI  Skin: No itching, burning, rashes or lesions   Musculoskeletal: No joint pain or swelling; No muscle, back or extremity pain    MEDICATIONS  (STANDING):  alteplase    Bolus 6.7 milliGRAM(s) IV Bolus Once  alteplase    IVPB 60.3 milliGRAM(s) IV Intermittent Once  sodium chloride 0.9%. 1000 milliLiter(s) (100 mL/Hr) IV Continuous <Continuous>    Allergies  Compazine (Unknown)    Vital Signs Last 24 Hrs  T(C): 36.7 (29 Mar 2021 02:07), Max: 36.7 (29 Mar 2021 02:07)  T(F): 98.1 (29 Mar 2021 02:07), Max: 98.1 (29 Mar 2021 02:07)  HR: 51 (29 Mar 2021 02:33) (51 - 64)  BP: 96/61 (29 Mar 2021 02:33) (96/61 - 126/72)  RR: 16 (29 Mar 2021 02:33) (16 - 16)  SpO2: 100% (29 Mar 2021 02:33) (95% - 100%)    Physical exam:  Constitutional: No acute distress, conversant  Pulmonary: Anterior breath sounds clear bilaterally, no crackles or wheezing. No use of accessory muscles    Neurologic:  -Mental status: Awake, alert, oriented to person and time, not place (said ambulance). Speech is mild-to moderately dysarthric with intact naming, repetition, and comprehension. Recent and remote memory intact. Follows commands. Attention/concentration intact. Fund of knowledge appropriate.  -Cranial nerves:   II: Left hemianopia  III, IV, VI: Right gaze preference, does not cross midline, PERRL  V:  Facial sensation V1-V3 equal and intact   VII: Left facial paralysis  VIII: Gross hearing is intact  Motor: Normal bulk and tone. No pronator drift. Strength 5/5 in right upper and lower extremities. Left arm spontaneously moves, but not to command, at least 4/5. Left leg 4/5.   Sensation:  Decreased sensation along left face, arm, leg. Left sensory neglect.   Coordination: dysmetria on finger-to-nose on right  Reflexes: Downgoing toes bilaterally     NIHSS: **** ICH Score: ******    Fingerstick Blood Glucose: CAPILLARY BLOOD GLUCOSE  130 (29 Mar 2021 02:54)      POCT Blood Glucose.: 130 mg/dL (29 Mar 2021 02:06)    LABS:                        12.5   7.15  )-----------( 200      ( 29 Mar 2021 02:16 )             38.0     03-29    140  |  106  |  x   ----------------------------<  x   4.0   |  x   |  x         PT/INR - ( 29 Mar 2021 02:16 )   PT: 11.0 sec;   INR: 0.91          PTT - ( 29 Mar 2021 02:16 )  PTT:25.4 sec          RADIOLOGY & ADDITIONAL STUDIES:      -----------------------------------------------------------------------------------------------------------------  IV-tPA (Y/N):    ***                              Bolus time:  Reason IV-tPA not given: ***    **STROKE CODE CONSULT NOTE**    Last known well time: 10:00 -10:30 PM on 3/28/21  Time of onset of symptoms: Woke up with symptoms around 1:30 AM on 3/29/21    HPI: 66 y/o male with PMH Afib (not on AC) and BPH who was BIBEMS for left sided weakness since waking up at 1:30 AM this morning. Pt states he woke up to use the restroom, when he slipped and fell. No head trauma or LOC. Pt states he had difficulty standing up afterwards and reports his wife called EMS. As per wife at bedside, after falling, she noticed patient was weak on his left side, had a left facial droop and was slurring his speech. Upon arrival to the ED, patient's NIHSS 14. Pt denies headache, blurry vision, dizziness, chest pain, palpitations, abdominal pain, nausea, vomiting. CTH shows hyperdense material within the right M1 segment consistent with CTA findings of abrupt occlusion at the right M1 segment . CTP shows perfusion defect in the right MCA territory with a mismatch volume of 127 mL. tpa administered at 02:33 on 3/29/21.     T(C): 36.7 (03-29-21 @ 02:07), Max: 36.7 (03-29-21 @ 02:07)  HR: 51 (03-29-21 @ 02:33) (51 - 64)  BP: 96/61 (03-29-21 @ 02:33) (96/61 - 126/72)  RR: 16 (03-29-21 @ 02:33) (16 - 16)  SpO2: 100% (03-29-21 @ 02:33) (95% - 100%)    PAST MEDICAL & SURGICAL HISTORY:  Afib  BPH    SOCIAL HISTORY:    ROS:   Constitutional: No fever, weight loss or fatigue  Eyes: No eye pain or discharge  ENMT:  No difficulty hearing; No sinus or throat pain  Neck: No pain or stiffness  Respiratory: No cough, wheezing, chills or hemoptysis  Cardiovascular: No shortness of breath or leg swelling  Gastrointestinal: No diarrhea or constipation.  Genitourinary: No dysuria, frequency, hematuria or incontinence  Neurological: As per HPI  Skin: No itching, burning, rashes or lesions   Musculoskeletal: No joint pain or swelling; No muscle, back or extremity pain    MEDICATIONS  (STANDING):  alteplase    Bolus 6.7 milliGRAM(s) IV Bolus Once  alteplase    IVPB 60.3 milliGRAM(s) IV Intermittent Once  sodium chloride 0.9%. 1000 milliLiter(s) (100 mL/Hr) IV Continuous <Continuous>    Allergies  Compazine (Unknown)    Vital Signs Last 24 Hrs  T(C): 36.7 (29 Mar 2021 02:07), Max: 36.7 (29 Mar 2021 02:07)  T(F): 98.1 (29 Mar 2021 02:07), Max: 98.1 (29 Mar 2021 02:07)  HR: 51 (29 Mar 2021 02:33) (51 - 64)  BP: 96/61 (29 Mar 2021 02:33) (96/61 - 126/72)  RR: 16 (29 Mar 2021 02:33) (16 - 16)  SpO2: 100% (29 Mar 2021 02:33) (95% - 100%)    Physical exam:  Constitutional: No acute distress, conversant  Pulmonary: Anterior breath sounds clear bilaterally, no crackles or wheezing. No use of accessory muscles    Pre-tpa Neurologic Exam:  -Mental status: Awake, alert, oriented to person and time, not place (said ambulance). Speech is mild-to-moderately dysarthric with intact naming, repetition, and comprehension. Recent and remote memory intact. Follows commands. Attention/concentration intact. Fund of knowledge appropriate.  -Cranial nerves:   II: Left hemianopia  III, IV, VI: Right gaze preference, does not cross midline, PERRL  V:  Facial sensation V1-V3 equal and intact   VII: Left facial paralysis  VIII: Gross hearing is intact  Motor: Normal bulk. No pronator drift. Strength 5/5 in right upper and lower extremities. Increased tone in left arm. Left arm spontaneously moves, but not to command, at least 3+/5. Left leg 3+/5.   Sensation:  Decreased sensation along left face, arm, leg. Left sensory neglect along face, arm, leg.   Coordination: Dysmetria on finger-to-nose on right  Reflexes: Downgoing toes bilaterally     Pre-tpa NIHSS: 14    15 minutes Post-tpa Neurologic Exam:  -Mental status: Awake, alert, oriented to person, place, and time. Speech is mildly dysarthric with intact naming, repetition, and comprehension. Recent and remote memory intact. Follows commands. Attention/concentration intact. Fund of knowledge appropriate.  -Cranial nerves:   II: Left hemianopia  III, IV, VI: Right gaze preference, crosses midline, PERRL  V:  Facial sensation V1-V3 equal and intact   VII: Left facial paralysis  VIII: Gross hearing is intact  Motor: Normal bulk and tone. No pronator drift. Strength 5/5 in right upper and lower extremities. Increased tone in left arm, Left arm spontaneously moves, but not to command, at least 4/5. Left leg 4/5.   Sensation:  Intact to light touch throughout. Left sensory neglect along arm only.   Coordination: Dysmetria on finger-to-nose on right  Reflexes: Downgoing toes bilaterally     Pre-tpa NIHSS: 14    Fingerstick Blood Glucose: CAPILLARY BLOOD GLUCOSE  130 (29 Mar 2021 02:54)  POCT Blood Glucose.: 130 mg/dL (29 Mar 2021 02:06)    LABS:                        12.5   7.15  )-----------( 200      ( 29 Mar 2021 02:16 )             38.0     140  |  106  |  x   ----------------------------<  x   4.0   |  x   |  x       PT/INR - ( 29 Mar 2021 02:16 )   PT: 11.0 sec;   INR: 0.91       PTT - ( 29 Mar 2021 02:16 )  PTT:25.4 sec      RADIOLOGY & ADDITIONAL STUDIES:  < from: CT Brain Stroke Protocol (03.29.21 @ 02:27) >  Hyperdense material within the right M1 segment concerning for thrombus. No evidence of parenchymal edema or intracranial hemorrhage at this time.    < from: CT Perfusion w/ Maps w/ IV Cont (03.29.21 @ 02:27) >  Perfusion defect in the right MCA territory with a mismatch volume of 127 mL.  (Findings agree with the high attenuating thrombus within the right M1 segment on the noncontrast CT head and upright occlusion of the right M1 branch on the CTA head and neck).    < from: CT Angio Head & Neck w/ IV Cont (03.29.21 @ 02:28) >  Abrupt occlusion at the right M1 segment as described above.  -----------------------------------------------------------------------------------------------------------------  IV-tPA (Y/N):  YES                             Bolus time: 02:33 AM on 3/29/21    **STROKE CODE CONSULT NOTE**    Last known well time: 10:00 -10:30 PM on 3/28/21  Time of onset of symptoms: Woke up with symptoms around 1:30 AM on 3/29/21    HPI: 64 y/o male with PMH Afib (not on AC) and BPH who was BIBEMS for left sided weakness since waking up at 1:30 AM this morning. Pt states he woke up to use the restroom, when he slipped and fell. No head trauma or LOC. Pt states he had difficulty standing up afterwards and reports his wife called EMS. As per wife at bedside, after falling, she noticed patient was weak on his left side, had a left facial droop and was slurring his speech. Upon arrival to the ED, patient's NIHSS 14. Pt denies headache, blurry vision, dizziness, chest pain, palpitations, abdominal pain, nausea, vomiting. CTH shows hyperdense material within the right M1 segment consistent with CTA findings of abrupt occlusion at the right M1 segment . CTP shows perfusion defect in the right MCA territory with a mismatch volume of 127 mL. tpa administered at 02:33 on 3/29/21.     T(C): 36.7 (03-29-21 @ 02:07), Max: 36.7 (03-29-21 @ 02:07)  HR: 51 (03-29-21 @ 02:33) (51 - 64)  BP: 96/61 (03-29-21 @ 02:33) (96/61 - 126/72)  RR: 16 (03-29-21 @ 02:33) (16 - 16)  SpO2: 100% (03-29-21 @ 02:33) (95% - 100%)    PAST MEDICAL & SURGICAL HISTORY:  Afib  BPH    SOCIAL HISTORY:    ROS:   Constitutional: No fever, weight loss or fatigue  Eyes: No eye pain or discharge  ENMT:  No difficulty hearing; No sinus or throat pain  Neck: No pain or stiffness  Respiratory: No cough, wheezing, chills or hemoptysis  Cardiovascular: No shortness of breath or leg swelling  Gastrointestinal: No diarrhea or constipation.  Genitourinary: No dysuria, frequency, hematuria or incontinence  Neurological: As per HPI  Skin: No itching, burning, rashes or lesions   Musculoskeletal: No joint pain or swelling; No muscle, back or extremity pain    MEDICATIONS  (STANDING):  alteplase    Bolus 6.7 milliGRAM(s) IV Bolus Once  alteplase    IVPB 60.3 milliGRAM(s) IV Intermittent Once  sodium chloride 0.9%. 1000 milliLiter(s) (100 mL/Hr) IV Continuous <Continuous>    Allergies  Compazine (Unknown)    Vital Signs Last 24 Hrs  T(C): 36.7 (29 Mar 2021 02:07), Max: 36.7 (29 Mar 2021 02:07)  T(F): 98.1 (29 Mar 2021 02:07), Max: 98.1 (29 Mar 2021 02:07)  HR: 51 (29 Mar 2021 02:33) (51 - 64)  BP: 96/61 (29 Mar 2021 02:33) (96/61 - 126/72)  RR: 16 (29 Mar 2021 02:33) (16 - 16)  SpO2: 100% (29 Mar 2021 02:33) (95% - 100%)    Physical exam:  Constitutional: No acute distress, conversant  Pulmonary: Anterior breath sounds clear bilaterally, no crackles or wheezing. No use of accessory muscles    Pre-tpa Neurologic Exam:  -Mental status: Awake, alert, oriented to person and time, not place (said ambulance). Speech is mild-to-moderately dysarthric with intact naming, repetition, and comprehension. Recent and remote memory intact. Follows commands. Attention/concentration intact. Fund of knowledge appropriate.  -Cranial nerves:   II: Left hemianopia  III, IV, VI: Right gaze preference, does not cross midline, PERRL  V:  Facial sensation V1-V3 equal and intact   VII: Left facial paralysis  VIII: Gross hearing is intact  Motor: Normal bulk. No pronator drift. Strength 5/5 in right upper and lower extremities. Increased tone in left arm. Left arm spontaneously moves, but not to command, at least 3+/5. Left leg 3+/5.   Sensation:  Decreased sensation along left face, arm, leg. Left sensory neglect along face, arm, leg.   Coordination: Dysmetria on finger-to-nose on right  Reflexes: Downgoing toes bilaterally     Pre-tpa NIHSS: 14    15 minutes Post-tpa Neurologic Exam:  -Mental status: Awake, alert, oriented to person, place, and time. Speech is mildly dysarthric with intact naming, repetition, and comprehension. Recent and remote memory intact. Follows commands. Attention/concentration intact. Fund of knowledge appropriate.  -Cranial nerves:   II: Left hemianopia  III, IV, VI: Right gaze preference, crosses midline, PERRL  V:  Facial sensation V1-V3 equal and intact   VII: Left facial paralysis  VIII: Gross hearing is intact  Motor: Normal bulk and tone. No pronator drift. Strength 5/5 in right upper and lower extremities. Increased tone in left arm, Left arm spontaneously moves, but not to command, at least 4/5. Left leg 4/5.   Sensation:  Intact to light touch throughout. Left sensory neglect along arm only.   Coordination: Dysmetria on finger-to-nose on right  Reflexes: Downgoing toes bilaterally     15 minutes post-tpa NIHSS: 14    Fingerstick Blood Glucose: CAPILLARY BLOOD GLUCOSE  130 (29 Mar 2021 02:54)  POCT Blood Glucose.: 130 mg/dL (29 Mar 2021 02:06)    LABS:                        12.5   7.15  )-----------( 200      ( 29 Mar 2021 02:16 )             38.0     140  |  106  |  x   ----------------------------<  x   4.0   |  x   |  x       PT/INR - ( 29 Mar 2021 02:16 )   PT: 11.0 sec;   INR: 0.91       PTT - ( 29 Mar 2021 02:16 )  PTT:25.4 sec      RADIOLOGY & ADDITIONAL STUDIES:  < from: CT Brain Stroke Protocol (03.29.21 @ 02:27) >  Hyperdense material within the right M1 segment concerning for thrombus. No evidence of parenchymal edema or intracranial hemorrhage at this time.    < from: CT Perfusion w/ Maps w/ IV Cont (03.29.21 @ 02:27) >  Perfusion defect in the right MCA territory with a mismatch volume of 127 mL.  (Findings agree with the high attenuating thrombus within the right M1 segment on the noncontrast CT head and upright occlusion of the right M1 branch on the CTA head and neck).    < from: CT Angio Head & Neck w/ IV Cont (03.29.21 @ 02:28) >  Abrupt occlusion at the right M1 segment as described above.  -----------------------------------------------------------------------------------------------------------------  IV-tPA (Y/N):  YES                             Bolus time: 02:33 AM on 3/29/21

## 2021-03-29 NOTE — H&P ADULT - NSHPPHYSICALEXAM_GEN_ALL_CORE
VITAL SIGNS:  T(C): 36.7 (03-29-21 @ 02:07), Max: 36.7 (03-29-21 @ 02:05)  T(F): 98.1 (03-29-21 @ 02:07), Max: 98.1 (03-29-21 @ 02:05)  HR: 53 (03-29-21 @ 03:00) (51 - 64)  BP: 123/75 (03-29-21 @ 03:00) (96/61 - 129/72)  BP(mean): --  RR: 16 (03-29-21 @ 03:00) (16 - 16)  SpO2: 100% (03-29-21 @ 03:00) (95% - 100%)  Wt(kg): --    PHYSICAL EXAM:  Constitutional: WDWN resting comfortably in bed; NAD  Neurologic: AAOx3; Speech mildly dysarthric, no aphasia; R gaze preference, able to cross midline, PERRL. +Left facial droop, tongue midline, facial sensation intact; no pronator drift; RUE 5/5 throughout, LUE moves spontaneously 3/5 but not to command, RLE 5/5 throughout, LLE 4/5 throughout. Sensory neglect to L arm, otherwise SILT.   Head: NC/AT  Eyes: PERRL, EOMI, anicteric sclera  ENT: no nasal discharge; uvula midline, no oropharyngeal erythema or exudates; MMM  Neck: supple; no JVD or thyromegaly  Respiratory: CTA B/L; no W/R/R, no retractions  Cardiac: +S1/S2; RRR; no M/R/G; PMI non-displaced  Gastrointestinal: soft, NT/ND; no rebound or guarding; +BSx4  Extremities: WWP, no clubbing or cyanosis; no peripheral edema  Vascular: 2+ radial, femoral, DP/PT pulses B/L  Dermatologic: skin warm, dry and intact; no rashes, wounds, or scars  Psychiatric: affect and characteristics of appearance, verbalizations, behaviors are appropriate

## 2021-03-29 NOTE — PROGRESS NOTE ADULT - SUBJECTIVE AND OBJECTIVE BOX
Neurology Stroke Progress Note    INTERVAL HPI/OVERNIGHT EVENTS: Admitted overnight to neuroICU s/p tPA (@02:33) and thrombectomy per protocol. Pt is in no acute distress and currently has no complaints at this time. Pt states he is doing well.     MEDICATIONS  (STANDING):  atorvastatin 80 milliGRAM(s) Oral at bedtime  dextrose 5%. 1000 milliLiter(s) (100 mL/Hr) IV Continuous <Continuous>  glucagon  Injectable 1 milliGRAM(s) IntraMuscular once  insulin lispro (ADMELOG) corrective regimen sliding scale   SubCutaneous Before meals and at bedtime  senna 2 Tablet(s) Oral at bedtime  sodium chloride 0.9%. 1000 milliLiter(s) (100 mL/Hr) IV Continuous <Continuous>    MEDICATIONS  (PRN):  acetaminophen   Tablet .. 650 milliGRAM(s) Oral every 6 hours PRN Temp greater or equal to 38.5C (101.3F), Moderate Pain (4 - 6)  ondansetron Injectable 4 milliGRAM(s) IV Push every 8 hours PRN Nausea and/or Vomiting      Allergies    Compazine (Unknown)    Intolerances        ROS: As per HPI, otherwise negative    Vital Signs Last 24 Hrs  T(C): 36.9 (29 Mar 2021 09:40), Max: 36.9 (29 Mar 2021 09:40)  T(F): 98.4 (29 Mar 2021 09:40), Max: 98.4 (29 Mar 2021 09:40)  HR: 61 (29 Mar 2021 09:00) (42 - 64)  BP: 96/56 (29 Mar 2021 08:00) (94/57 - 129/72)  BP(mean): 72 (29 Mar 2021 08:00) (70 - 81)  RR: 16 (29 Mar 2021 09:00) (16 - 18)  SpO2: 99% (29 Mar 2021 09:00) (95% - 100%)    Physical exam:  General: awake laying comfortably in bed, no acute distress    Neurologic:  Mental status: awake, alert, oriented to person, place, and time. Speech is fluent, able to name objects. Follows commands. Attention/concentration intact. No dysarthria, no aphasia.  Cranial nerves:   II: visual fields are full to confrontation. pupils equally round and reactive to light,   III, IV, VI: EOMI without nystagmus  V:  V1-V3 sensation intact,   VII: normal eye closure. +Left facial droop.   VIII: hearing is intact to finger rub  XI: shoulder shrug are intact.  XII: tongue midline  Motor: Normal bulk and tone, strength 4/5 in LUE, 5/5 RLE/RUE,/LLE,  strength 4/5 LUE, 5/5 RUE. L pronator drift  Sensation: intact to light touch. + L sided neglect.  Coordination: No dysmetria on finger-to-nose and heel-to-shin      LABS:                        12.2   8.54  )-----------( 186      ( 29 Mar 2021 07:38 )             37.7     03-29    140  |  106  |  25<H>  ----------------------------<  149<H>  4.2   |  21<L>  |  1.15    Ca    8.8      29 Mar 2021 07:38  Phos  2.7     03-29  Mg     1.9     03-29    TPro  5.9<L>  /  Alb  4.0  /  TBili  0.2  /  DBili  x   /  AST  22  /  ALT  19  /  AlkPhos  43  03-29    PT/INR - ( 29 Mar 2021 02:16 )   PT: 11.0 sec;   INR: 0.91          PTT - ( 29 Mar 2021 02:16 )  PTT:25.4 sec           Neurology Stroke Progress Note    INTERVAL HPI/OVERNIGHT EVENTS: Admitted overnight to neuroICU s/p tPA (@02:33) and thrombectomy per protocol. Pt is in no acute distress and currently has no complaints at this time. Pt states he is doing well and is asking when he can go home, return to work, and return back to running/everyday exercising as well as skiing.     MEDICATIONS  (STANDING):  atorvastatin 80 milliGRAM(s) Oral at bedtime  dextrose 5%. 1000 milliLiter(s) (100 mL/Hr) IV Continuous <Continuous>  glucagon  Injectable 1 milliGRAM(s) IntraMuscular once  insulin lispro (ADMELOG) corrective regimen sliding scale   SubCutaneous Before meals and at bedtime  senna 2 Tablet(s) Oral at bedtime  sodium chloride 0.9%. 1000 milliLiter(s) (100 mL/Hr) IV Continuous <Continuous>    MEDICATIONS  (PRN):  acetaminophen   Tablet .. 650 milliGRAM(s) Oral every 6 hours PRN Temp greater or equal to 38.5C (101.3F), Moderate Pain (4 - 6)  ondansetron Injectable 4 milliGRAM(s) IV Push every 8 hours PRN Nausea and/or Vomiting      Allergies    Compazine (Unknown)    Intolerances        ROS: As per HPI, otherwise negative    Vital Signs Last 24 Hrs  T(C): 36.9 (29 Mar 2021 09:40), Max: 36.9 (29 Mar 2021 09:40)  T(F): 98.4 (29 Mar 2021 09:40), Max: 98.4 (29 Mar 2021 09:40)  HR: 61 (29 Mar 2021 09:00) (42 - 64)  BP: 96/56 (29 Mar 2021 08:00) (94/57 - 129/72)  BP(mean): 72 (29 Mar 2021 08:00) (70 - 81)  RR: 16 (29 Mar 2021 09:00) (16 - 18)  SpO2: 99% (29 Mar 2021 09:00) (95% - 100%)    Physical exam:  General: awake laying comfortably in bed, no acute distress    Neurologic:  Mental status: awake, alert, oriented to person, place, and time. Speech is fluent, able to name objects. Follows commands. Attention/concentration intact. Mild dysarthria, no aphasia.  Cranial nerves:   II: visual fields are full to confrontation. pupils equally round and reactive to light,   III, IV, VI: EOMI without nystagmus  V:  V1-V3 sensation intact,   VII: normal eye closure. +Left facial droop.   VIII: hearing is intact to finger rub  XI: shoulder shrug are intact.  XII: tongue midline  Motor: Normal bulk and tone, strength 4/5 in Left UE, 5/5 Right LE/Right UE,/Left LE,  strength 4/5 Left UE, 5/5 Right UE. Left pronator drift  Sensation: intact to light touch. + Left sided neglect.  Coordination: No dysmetria on finger-to-nose       LABS:                        12.2   8.54  )-----------( 186      ( 29 Mar 2021 07:38 )             37.7     03-29    140  |  106  |  25<H>  ----------------------------<  149<H>  4.2   |  21<L>  |  1.15    Ca    8.8      29 Mar 2021 07:38  Phos  2.7     03-29  Mg     1.9     03-29    TPro  5.9<L>  /  Alb  4.0  /  TBili  0.2  /  DBili  x   /  AST  22  /  ALT  19  /  AlkPhos  43  03-29    PT/INR - ( 29 Mar 2021 02:16 )   PT: 11.0 sec;   INR: 0.91          PTT - ( 29 Mar 2021 02:16 )  PTT:25.4 sec

## 2021-03-29 NOTE — PROGRESS NOTE ADULT - SUBJECTIVE AND OBJECTIVE BOX
***    Neurocritical Care Attending    ***       66 y/o male with PMH Afib (not on AC due to low CHADS VASC score) and BPH who was BIBEMS for fall caused by new onset left sided weakness since waking up at 1:30 AM this morning. Last known well 10:30 PM on 3/28. No head trauma or LOC.  As per wife at bedside, after falling, she noticed patient was weak on his left side, had a left facial droop and was slurring his speech. Upon arrival to the ED, patient's NIHSS 14. Pt denies headache, blurry vision, dizziness, chest pain, palpitations, abdominal pain, nausea, vomiting. Imaging showing high attenuating thrombus within the right M1 segment on the noncontrast CT head, upright occlusion of the right M1 branch on the CTA head and neck, and perfusion defect in the R MCA territory. TPA infusion started at 02:45 and patient now s/p mechanical thrombectomy for right M1 occlusion.    Postop course: doing well, improving exam, no complications    Gen:   CV: RRR  Pulm: CTA b/l  Abd: soft NTND    MSE: awake, alert, oriented x3, follows multistep commands, attention normal, language fluent   CN: MAURICIO, EOMI, VFF, face with midl elft lwoer facial, TUP midline, no dysarthria  Motor: left arm antigravity but with prominant drift other wise full strength  Sensory: intact to LT throughout   Extrem: groin puncture sites intact    Allergies: Compazine (Unknown)      EXAM:  VITALS:  T(C): 35.2 (03-29-21 @ 04:41), Max: 36.7 (03-29-21 @ 02:05)  HR: 50 (03-29-21 @ 08:00) (42 - 64)  BP: 96/56 (03-29-21 @ 08:00) (94/57 - 129/72)  RR: 16 (03-29-21 @ 08:00) (16 - 18)  SpO2: 98% (03-29-21 @ 08:00) (95% - 100%)    MEDICATIONS:  acetaminophen   Tablet .. 650 milliGRAM(s) Oral every 6 hours PRN  atorvastatin 80 milliGRAM(s) Oral at bedtime  dextrose 5%. 1000 milliLiter(s) IV Continuous <Continuous>  glucagon  Injectable 1 milliGRAM(s) IntraMuscular once  insulin lispro (ADMELOG) corrective regimen sliding scale   SubCutaneous Before meals and at bedtime  ondansetron Injectable 4 milliGRAM(s) IV Push every 8 hours PRN  senna 2 Tablet(s) Oral at bedtime  sodium chloride 0.9%. 1000 milliLiter(s) IV Continuous <Continuous>      I/O's    03-28-21 @ 07:01  -  03-29-21 @ 07:00  --------------------------------------------------------  IN: 300 mL / OUT: 300 mL / NET: 0 mL    03-29-21 @ 07:01  -  03-29-21 @ 08:50  --------------------------------------------------------  IN: 100 mL / OUT: 0 mL / NET: 100 mL        Ventilator data:      LABS:                          12.2   8.54  )-----------( 186      ( 29 Mar 2021 07:38 )             37.7     03-29    140  |  106  |  25<H>  ----------------------------<  149<H>  4.2   |  21<L>  |  1.15    Ca    8.8      29 Mar 2021 07:38  Phos  2.7     03-29  Mg     1.9     03-29    TPro  5.9<L>  /  Alb  4.0  /  TBili  0.2  /  DBili  x   /  AST  22  /  ALT  19  /  AlkPhos  43  03-29    PT/INR - ( 29 Mar 2021 02:16 )   PT: 11.0 sec;   INR: 0.91     PTT - ( 29 Mar 2021 02:16 )  PTT:25.4 sec    CARDIAC MARKERS ( 29 Mar 2021 06:52 )  x     / <0.01 ng/mL / x     / x     / x          CAPILLARY BLOOD GLUCOSE  130 (29 Mar 2021 02:54)    POCT Blood Glucose.: 115 mg/dL (29 Mar 2021 06:28)  POCT Blood Glucose.: 130 mg/dL (29 Mar 2021 02:06)    A1c 6.1

## 2021-03-29 NOTE — PROGRESS NOTE ADULT - ASSESSMENT
Pt is a 64 yo M w/ PMH afib (not on prior anticoagulation) and BPH who was BIBEMS for L sided weakness and L facial droop after waking up at 01:30 early this morning. Pt stated he went to sleep around 10/10:30 on 3/28. CTH showed significant hyperdense material within the R M1 segment consistent with CTA findings of abrupt occlusion at the right M1 segment. CTP showed perfusion defect in the R MCA territory with mismatch volume of 127 mL. tPa was administered at 02:33, neurosx was consulted and pt was taken for angiogram and then underwent successful mechanical thrombectomy with TICI 0 to 3. PT was admitted to NSICU as per protocol for post angio/tPa monitoring. Pt is to have repeat HCT at 02:30 on 3/30.     1)Post tPa monitoring   -No antiplatelet or anticoagulation for 24 hrs s/p tPA   -Repeat HCT post tPA at 02:33 on 3/30   -Continue stroke neuro checks q 1 hr until 24 hrs s/p tPA (02:33)   -Minimize arterial/venous punctures   -Maintain strict bed rest until 24 hrs s/p tPA and HOB <30 degrees     2) Stroke Management   -Continue atorvostatin 80 mg PO QD   -A1C: 6.1  Cholesterol: 168, triglycerides: 91 HDL: 59, LDL; 91 TSH: 2.235  -Obtain MR Brain w/o contrast   -Obtain ECHO w/o bubble for baseline function     3) Further workup   -Stroke neuro checks q4 hrs  -BP goal <180/105  -Stroke education    DVT prophylaxis   - SCDs (no anticoag 24 hrs s/p tPA)    Pt is a 64 yo M w/ PMH afib (not on prior anticoagulation) and BPH who was BIBEMS for L sided weakness and L facial droop after waking up at 01:30 early this morning. Pt stated he went to sleep around 10/10:30 on 3/28. CTH showed significant hyperdense material within the R M1 segment consistent with CTA findings of abrupt occlusion at the right M1 segment. CTP showed perfusion defect in the R MCA territory with mismatch volume of 127 mL. tPa was administered at 02:33 3/29, neurosx was consulted and pt was taken for angiogram and then underwent successful mechanical thrombectomy with TICI 0 to 3. PT was admitted to NSICU as per protocol for post angio/tPa monitoring. Pt is to have repeat HCT at 02:30 on 3/30.     1)Post tPa monitoring   -No antiplatelet or anticoagulation for 24 hrs s/p tPA   -Repeat HCT post tPA at 02:33 on 3/30   -Continue stroke neuro checks q 1 hr until 24 hrs s/p tPA (02:33) on 3/30  -Minimize arterial/venous punctures   -Maintain strict bed rest until 24 hrs s/p tPA and HOB <30 degrees     2) Stroke Management   -Continue atorvostatin 80 mg PO QD   -A1C: 6.1  Cholesterol: 168, triglycerides: 91 HDL: 59, LDL; 91 TSH: 2.235  -Obtain ECHO for baseline function     3) Further workup   -Stroke neuro checks q1 hrs  -SBP goal 110-150 as per neurosx    -Stroke education    DVT prophylaxis   - SCDs (no anticoag 24 hrs s/p tPA)

## 2021-03-29 NOTE — PROGRESS NOTE ADULT - ASSESSMENT
65y/M with  acute cerebrovascular accident, R MCA occlusion, s/p tPA, s/p thrombectomy (03/29/2021, Dr. Pisano)   Atrial fibrillation  BPH without urinary obstruction    PLAN:   NEURO: neurochecks q1h, PRN pain meds with acetaminophen  complete stroke core measures, start ASA once 24-h CT shows no heme  REHAB:  physical therapy evaluation and management    EARLY MOB:  HOB up    PULM:  Room air, incentive spirometry  CARDIO:  SBP goal 100-150mm Hg, continue metoprolol, needs full anticoagulation at some point  ENDO:  Blood sugar goals 140-180 mg/dL, continue high dose statins  GI:  bowel regimen  DIET: regular  RENAL:  IVL  HEM/ONC: Hb stable  VTE Prophylaxis: SCDs, no DVT chemoprophylaxis for now as patient is high risk for bleed (s/p tPA)  ID: afebrile, no leukocytosis  Social: will update family    CORE MEASURES:  1.  NIHSS = 14  2.  Antithrombotic therapy:  [ ]  administered within 24 hours of admission or [X] reason not done: given tPA  3.  VTE prophylaxis: [ ] administered within 24 hours of admission or [X] reason not done: given tPA  4.  Dysphagia screening:  [X] performed before any oral meds / liquids / food  5.  HbA1C and lipid panel: [X] ordered on admission    ATTENDING ATTESTATION:  I was physically present for the key portions of the evaluation and management (E/M) service provided.  I agree with the above history, physical and plan, which I have reviewed and edited where appropriate.    Patient at high risk for neurological deterioration or death due to:  ICU delirium, aspiration PNA, DVT / PE.  Critical care time:  I have personally provided 30 minutes of critical care time, excluding time spent on separate procedures.      Plan discussed with RN, house staff.

## 2021-03-29 NOTE — BRIEF OPERATIVE NOTE - NSICDXBRIEFPREOP_GEN_ALL_CORE_FT
PRE-OP DIAGNOSIS:  Cerebrovascular accident (CVA) due to occlusion 29-Mar-2021 04:20:32 Right MCA/M1 occlusion TICI 0 Huan Don

## 2021-03-29 NOTE — H&P ADULT - HISTORY OF PRESENT ILLNESS
64 y/o male with PMH Afib (not on AC) and BPH who was BIBEMS for fall caused by new onset left sided weakness since waking up at 1:30 AM this morning. Last known well 10:30 PM on 3/28. No head trauma or LOC.  As per wife at bedside, after falling, she noticed patient was weak on his left side, had a left facial droop and was slurring his speech. Upon arrival to the ED, patient's NIHSS 14. Pt denies headache, blurry vision, dizziness, chest pain, palpitations, abdominal pain, nausea, vomiting. Imaging showing high attenuating thrombus within the right M1 segment on the noncontrast CT head, upright occlusion of the right M1 branch on the CTA head and neck, and perfusion defect in the R MCA territory. TPA infusion started at 02:45 and patient now s/p mechanical thrombectomy for right M1 occlusion.    NIHSS in ER: 14  GCS: 15

## 2021-03-29 NOTE — H&P ADULT - ASSESSMENT
66 y/o male with PMH AFib (not on AC) and BPH presented with L sided weakness, found to have R M1 occlusion, now s/p TPA and mechanical thrombectomy (3/29).

## 2021-03-30 LAB
ANION GAP SERPL CALC-SCNC: 8 MMOL/L — SIGNIFICANT CHANGE UP (ref 5–17)
BUN SERPL-MCNC: 19 MG/DL — SIGNIFICANT CHANGE UP (ref 7–23)
CALCIUM SERPL-MCNC: 8.2 MG/DL — LOW (ref 8.4–10.5)
CHLORIDE SERPL-SCNC: 111 MMOL/L — HIGH (ref 96–108)
CO2 SERPL-SCNC: 23 MMOL/L — SIGNIFICANT CHANGE UP (ref 22–31)
COVID-19 SPIKE DOMAIN AB INTERP: POSITIVE
COVID-19 SPIKE DOMAIN ANTIBODY RESULT: 15.7 U/ML — HIGH
CREAT SERPL-MCNC: 1.03 MG/DL — SIGNIFICANT CHANGE UP (ref 0.5–1.3)
GLUCOSE SERPL-MCNC: 107 MG/DL — HIGH (ref 70–99)
HCT VFR BLD CALC: 36.3 % — LOW (ref 39–50)
HCV AB S/CO SERPL IA: 0.1 S/CO — SIGNIFICANT CHANGE UP
HCV AB SERPL-IMP: SIGNIFICANT CHANGE UP
HGB BLD-MCNC: 11.7 G/DL — LOW (ref 13–17)
MAGNESIUM SERPL-MCNC: 2 MG/DL — SIGNIFICANT CHANGE UP (ref 1.6–2.6)
MCHC RBC-ENTMCNC: 30.1 PG — SIGNIFICANT CHANGE UP (ref 27–34)
MCHC RBC-ENTMCNC: 32.2 GM/DL — SIGNIFICANT CHANGE UP (ref 32–36)
MCV RBC AUTO: 93.3 FL — SIGNIFICANT CHANGE UP (ref 80–100)
NRBC # BLD: 0 /100 WBCS — SIGNIFICANT CHANGE UP (ref 0–0)
PHOSPHATE SERPL-MCNC: 2.6 MG/DL — SIGNIFICANT CHANGE UP (ref 2.5–4.5)
PLATELET # BLD AUTO: 177 K/UL — SIGNIFICANT CHANGE UP (ref 150–400)
POTASSIUM SERPL-MCNC: 3.9 MMOL/L — SIGNIFICANT CHANGE UP (ref 3.5–5.3)
POTASSIUM SERPL-SCNC: 3.9 MMOL/L — SIGNIFICANT CHANGE UP (ref 3.5–5.3)
RBC # BLD: 3.89 M/UL — LOW (ref 4.2–5.8)
RBC # FLD: 13.2 % — SIGNIFICANT CHANGE UP (ref 10.3–14.5)
SARS-COV-2 IGG+IGM SERPL QL IA: 15.7 U/ML — HIGH
SARS-COV-2 IGG+IGM SERPL QL IA: POSITIVE
SODIUM SERPL-SCNC: 142 MMOL/L — SIGNIFICANT CHANGE UP (ref 135–145)
WBC # BLD: 11.39 K/UL — HIGH (ref 3.8–10.5)
WBC # FLD AUTO: 11.39 K/UL — HIGH (ref 3.8–10.5)

## 2021-03-30 PROCEDURE — 70450 CT HEAD/BRAIN W/O DYE: CPT | Mod: 26

## 2021-03-30 PROCEDURE — 99233 SBSQ HOSP IP/OBS HIGH 50: CPT

## 2021-03-30 PROCEDURE — 99024 POSTOP FOLLOW-UP VISIT: CPT

## 2021-03-30 RX ORDER — SODIUM CHLORIDE 9 MG/ML
500 INJECTION INTRAMUSCULAR; INTRAVENOUS; SUBCUTANEOUS ONCE
Refills: 0 | Status: COMPLETED | OUTPATIENT
Start: 2021-03-30 | End: 2021-03-30

## 2021-03-30 RX ORDER — ASPIRIN/CALCIUM CARB/MAGNESIUM 324 MG
81 TABLET ORAL DAILY
Refills: 0 | Status: DISCONTINUED | OUTPATIENT
Start: 2021-03-30 | End: 2021-03-31

## 2021-03-30 RX ORDER — ENOXAPARIN SODIUM 100 MG/ML
40 INJECTION SUBCUTANEOUS EVERY 24 HOURS
Refills: 0 | Status: DISCONTINUED | OUTPATIENT
Start: 2021-03-30 | End: 2021-03-31

## 2021-03-30 RX ADMIN — ENOXAPARIN SODIUM 40 MILLIGRAM(S): 100 INJECTION SUBCUTANEOUS at 21:17

## 2021-03-30 RX ADMIN — SENNA PLUS 2 TABLET(S): 8.6 TABLET ORAL at 21:17

## 2021-03-30 RX ADMIN — ATORVASTATIN CALCIUM 80 MILLIGRAM(S): 80 TABLET, FILM COATED ORAL at 21:17

## 2021-03-30 RX ADMIN — Medication 12.5 MILLIGRAM(S): at 05:35

## 2021-03-30 RX ADMIN — Medication 81 MILLIGRAM(S): at 11:11

## 2021-03-30 RX ADMIN — SODIUM CHLORIDE 1000 MILLILITER(S): 9 INJECTION INTRAMUSCULAR; INTRAVENOUS; SUBCUTANEOUS at 01:59

## 2021-03-30 RX ADMIN — Medication 5 MILLIGRAM(S): at 21:17

## 2021-03-30 RX ADMIN — SODIUM CHLORIDE 1000 MILLILITER(S): 9 INJECTION INTRAMUSCULAR; INTRAVENOUS; SUBCUTANEOUS at 10:27

## 2021-03-30 RX ADMIN — SODIUM CHLORIDE 1000 MILLILITER(S): 9 INJECTION INTRAMUSCULAR; INTRAVENOUS; SUBCUTANEOUS at 05:36

## 2021-03-30 NOTE — PROGRESS NOTE ADULT - ASSESSMENT
66 y/o male with PMH Afib (not on AC due to low CHADS VASC score) presenting with R MCA syndrome and M1 occlusion with relatively small core infarct and large mismatch territory.    s/p thrombectomy with tici3 reperfusion. Doing well after thrombectomy, relatively mild deficits.    Neuro)  stroke with known afib  q4h neurochecks  post-tPA CTH stable  stroke core measures - lipitor 80  Echo, lipid profile  A1c 6.1  Stroke mechanism likely cardioembolic - will need full anticoagulation likely elequis in a few days  lovenox ppx  and ASA after CTH tonight  SBP goal - permissive up to 180  PT/Ot eval  regular diet    CV)  back on home metprolol    Heme)  Lovenox ppx - start today    GI) Regular diet    Renal - normonatremia goal, urinating without issue    ID - [id number]    Dispo - 7 lach today leesa

## 2021-03-30 NOTE — PROVIDER CONTACT NOTE (OTHER) - ACTION/TREATMENT ORDERED:
No interventions ordered at this time. Will continue to monitor. EVELINA House assessed pt at bedside. No interventions ordered at this time. Will continue to monitor.

## 2021-03-30 NOTE — OCCUPATIONAL THERAPY INITIAL EVALUATION ADULT - SENSORY TESTS
Visual fields are full to confrontation, H and Quad test intact, Eye movements presents with midline to left and midline to right slight nystagmus at end range, Pupils equally round and reactive to light, facial sensation V1-V3 equal and intact, face is asymmetric with L facial droop with normal eye closure, tongue protrudes midlines, R shoulder shrug intact- weaker L side, R puffing cheeks intact- weaker L side,  hearing bilaterally with rubs intact.

## 2021-03-30 NOTE — PROGRESS NOTE ADULT - SUBJECTIVE AND OBJECTIVE BOX
TRANSFER NOTE TO STROKE/ NEUROLOGY     S: neuro exam stable. CTH 24hrs post-TPA completed, read shows demarcated R MCA territory infarct, no MLS, some mass effect on R lateral ventricle.     64 y/o male with PMH Afib (not on AC) and BPH who was BIBEMS for fall caused by new onset left sided weakness since waking up at 1:30 AM this morning. Last known well 10:30 PM on 3/28. No head trauma or LOC.  As per wife at bedside, after falling, she noticed patient was weak on his left side, had a left facial droop and was slurring his speech. Upon arrival to the ED, patient's NIHSS 14. Pt denies headache, blurry vision, dizziness, chest pain, palpitations, abdominal pain, nausea, vomiting. Imaging showing high attenuating thrombus within the right M1 segment on the noncontrast CT head, upright occlusion of the right M1 branch on the CTA head and neck, and perfusion defect in the R MCA territory. TPA infusion started at 02:45 and patient now s/p mechanical thrombectomy for right M1 occlusion.    NIHSS in ER: 14  GCS: 15      Hospital Course:   3/29 - Presented to ED in rapid Afib, collapsed at home, NIH 14 on stroke code. CTA with right m1 occlusion. Received TPA and taken for angiography and mechanical thrombectomy, admitted to NSICU post procedure.  3/30: POD#1 s/p mechanical thrombectomy, TICI 0 --> TICI 3. CHARMAINE overnight, CTH 24hrs post-TPA completed, read shows demarcated R MCA territory infarct, no MLS, some mass effect on R lateral ventricle. OK to resume ASA per Dr. pisano but hold off on elliquis until 4/1.       Vital Signs Last 24 Hrs  T(C): 36.8 (30 Mar 2021 01:04), Max: 37.1 (29 Mar 2021 14:50)  T(F): 98.3 (30 Mar 2021 01:04), Max: 98.7 (29 Mar 2021 14:50)  HR: 84 (30 Mar 2021 13:00) (58 - 118)  BP: 104/55 (30 Mar 2021 13:00) (86/54 - 104/72)  BP(mean): 74 (30 Mar 2021 13:00) (65 - 82)  RR: 16 (30 Mar 2021 13:00) (15 - 19)  SpO2: 94% (30 Mar 2021 13:00) (94% - 99%)    I&O's Detail    29 Mar 2021 07:01  -  30 Mar 2021 07:00  --------------------------------------------------------  IN:    Oral Fluid: 500 mL    sodium chloride 0.9%: 700 mL    Sodium Chloride 0.9% Bolus: 1000 mL  Total IN: 2200 mL    OUT:    Voided (mL): 2625 mL  Total OUT: 2625 mL    Total NET: -425 mL      30 Mar 2021 07:01  -  30 Mar 2021 14:06  --------------------------------------------------------  IN:    Sodium Chloride 0.9% Bolus: 500 mL  Total IN: 500 mL    OUT:    Voided (mL): 0 mL  Total OUT: 0 mL    Total NET: 500 mL        I&O's Summary    29 Mar 2021 07:01  -  30 Mar 2021 07:00  --------------------------------------------------------  IN: 2200 mL / OUT: 2625 mL / NET: -425 mL    30 Mar 2021 07:01  -  30 Mar 2021 14:06  --------------------------------------------------------  IN: 500 mL / OUT: 0 mL / NET: 500 mL        PHYSICAL EXAM:  General: NAD, pt is comfortably sitting up in bed, A&O x3, on RA  HEENT: CN II-XII grossly intact, PERRL 3mm, EOMI b/l, + L facial asymmetry, tongue midline, neck FROM  Cardiovascular: RRR, normal S1 and S2   Respiratory: lungs CTAB, no wheezing, rhonchi, or crackles   GI: normoactive BS to auscultation, abd soft, NTND   Neuro: no aphasia, speech clear, no dysmetria, no pronator drift  SOSA x4 spontaneously, RUE/RLE/LLE strength 5/5, LUE strength 4+/5    SILT throughout   Extremities: distal pulses 2+ x4   Wound/incision: R groin site C/D/I       TUBES/LINES:  [] CVC  [] A-line  [] Lumbar Drain  [] Ventriculostomy  [] Other    DIET:  [] NPO  [x] Mechanical  [] Tube feeds    LABS:                        11.7   11.39 )-----------( 177      ( 30 Mar 2021 06:02 )             36.3     03-30    142  |  111<H>  |  19  ----------------------------<  107<H>  3.9   |  23  |  1.03    Ca    8.2<L>      30 Mar 2021 06:02  Phos  2.6     03-30  Mg     2.0     03-30    TPro  5.9<L>  /  Alb  4.0  /  TBili  0.2  /  DBili  x   /  AST  22  /  ALT  19  /  AlkPhos  43  03-29    PT/INR - ( 29 Mar 2021 02:16 )   PT: 11.0 sec;   INR: 0.91          PTT - ( 29 Mar 2021 02:16 )  PTT:25.4 sec    CARDIAC MARKERS ( 29 Mar 2021 06:52 )  x     / <0.01 ng/mL / x     / x     / x          CAPILLARY BLOOD GLUCOSE          Drug Levels: [] N/A    CSF Analysis: [] N/A      Allergies    Compazine (Unknown)    Intolerances      MEDICATIONS:  Antibiotics:    Neuro:  acetaminophen   Tablet .. 650 milliGRAM(s) Oral every 6 hours PRN  ondansetron Injectable 4 milliGRAM(s) IV Push every 8 hours PRN    Anticoagulation:  aspirin  chewable 81 milliGRAM(s) Oral daily  enoxaparin Injectable 40 milliGRAM(s) SubCutaneous every 24 hours    OTHER:  atorvastatin 80 milliGRAM(s) Oral at bedtime  bisacodyl 5 milliGRAM(s) Oral at bedtime  metoprolol tartrate 12.5 milliGRAM(s) Oral every 12 hours  senna 2 Tablet(s) Oral at bedtime    IVF:    CULTURES:    RADIOLOGY & ADDITIONAL TESTS:      ASSESSMENT:    65y Male with Afib not on AC, BPH, with acute onset left sided weakness at 1:30am, presented to ED as stroke code, NIH 14, with right MCA M1 occlusion on CTA, s/p TPA and now s/p cerebral angiogram for mechanical thrombectomy 3/29/21, TICI 0 to TICI 3. Post-TPA CTH on 3/30, no evidence of hemorrhagic transformation, well demarcated R MCA territory infarct present.     ACUTE CVA    No pertinent family history in first degree relatives    Handoff    MEWS Score    Atrial fibrillation    BPH without urinary obstruction    Cerebrovascular accident (CVA) due to occlusion    Cerebrovascular accident (CVA) due to occlusion    Acute CVA (cerebrovascular accident)    Acute CVA (cerebrovascular accident)    Atrial fibrillation, unspecified type    BPH without urinary obstruction    Angiogram, cerebral, with thrombectomy    History of hernia repair    STROKE SYMPTOMS    SysAdmin_VisitLink        PLAN:  Neuro:  q4 stroke neuro checks,  pain control PRN,   Tylenol PRN  Stroke neurology work-up, core measures as ordered  CT Head 24h post TPA completed, results as above   Resume ASA, begin elliquis 4/1    Cardiovascular:  -150,  Daily labs, electrolyte repletion PRN  TTE w/ bubble  Statin therapy for core measures    Pulmonary:  Saturating well on RA    Renal:  Family to provide home BPH meds,    GI:  regular diet   Stool softeners PRN    ID:  Afebrile    Endo:  ISS, HGB A1C 6.1%     DVT PROPHYLAXIS:  [x] Venodynes                                [x] Heparin/Lovenox    FALL RISK:  [] Low Risk                                    [] Impulsive    DISPOSITION:   transfer to stroke neurology stepdown, full code,  PT/OT evaluation   Follow up with Dr. Pisano outpatient   Assessment and plan discussed w/ Dr. Pisano, Dr. Mckeon, and Dr. Piedra     Assessment:  Present when checked    []  GCS  E   V  M     Heart Failure: []Acute, [] acute on chronic , []chronic  Heart Failure:  [] Diastolic (HFpEF), [] Systolic (HFrEF), []Combined (HFpEF and HFrEF), [] RHF, [] Pulm HTN, [] Other    [] LEEANNA, [] ATN, [] AIN, [] other  [] CKD1, [] CKD2, [] CKD 3, [] CKD 4, [] CKD 5, []ESRD    Encephalopathy: [] Metabolic, [] Hepatic, [] toxic, [] Neurological, [] Other    Abnormal Nurtitional Status: [] malnurtition (see nutrition note), [ ]underweight: BMI < 19, [] morbid obesity: BMI >40, [] Cachexia    [] Sepsis  [] hypovolemic shock,[] cardiogenic shock, [] hemorrhagic shock, [] neuogenic shock  [] Acute Respiratory Failure  []Cerebral edema, [] Brain compression/ herniation,   [] Functional quadriplegia  [] Acute blood loss anemia   TRANSFER NOTE TO STROKE/ NEUROLOGY -- spoke with Dr. Cedric Kendall from stroke/neuro team for sign out     S: neuro exam stable. CTH 24hrs post-TPA completed, read shows demarcated R MCA territory infarct, no MLS, some mass effect on R lateral ventricle.     64 y/o male with PMH Afib (not on AC) and BPH who was BIBEMS for fall caused by new onset left sided weakness since waking up at 1:30 AM this morning. Last known well 10:30 PM on 3/28. No head trauma or LOC.  As per wife at bedside, after falling, she noticed patient was weak on his left side, had a left facial droop and was slurring his speech. Upon arrival to the ED, patient's NIHSS 14. Pt denies headache, blurry vision, dizziness, chest pain, palpitations, abdominal pain, nausea, vomiting. Imaging showing high attenuating thrombus within the right M1 segment on the noncontrast CT head, upright occlusion of the right M1 branch on the CTA head and neck, and perfusion defect in the R MCA territory. TPA infusion started at 02:45 and patient now s/p mechanical thrombectomy for right M1 occlusion.    NIHSS in ER: 14  GCS: 15      Hospital Course:   3/29 - Presented to ED in rapid Afib, collapsed at home, NIH 14 on stroke code. CTA with right m1 occlusion. Received TPA and taken for angiography and mechanical thrombectomy, admitted to NSICU post procedure.  3/30: POD#1 s/p mechanical thrombectomy, TICI 0 --> TICI 3. CHARMAINE overnight, CTH 24hrs post-TPA completed, read shows demarcated R MCA territory infarct, no MLS, some mass effect on R lateral ventricle. OK to resume ASA per Dr. pisano but hold off on elliquis until 4/1.       Vital Signs Last 24 Hrs  T(C): 36.8 (30 Mar 2021 01:04), Max: 37.1 (29 Mar 2021 14:50)  T(F): 98.3 (30 Mar 2021 01:04), Max: 98.7 (29 Mar 2021 14:50)  HR: 84 (30 Mar 2021 13:00) (58 - 118)  BP: 104/55 (30 Mar 2021 13:00) (86/54 - 104/72)  BP(mean): 74 (30 Mar 2021 13:00) (65 - 82)  RR: 16 (30 Mar 2021 13:00) (15 - 19)  SpO2: 94% (30 Mar 2021 13:00) (94% - 99%)    I&O's Detail    29 Mar 2021 07:01  -  30 Mar 2021 07:00  --------------------------------------------------------  IN:    Oral Fluid: 500 mL    sodium chloride 0.9%: 700 mL    Sodium Chloride 0.9% Bolus: 1000 mL  Total IN: 2200 mL    OUT:    Voided (mL): 2625 mL  Total OUT: 2625 mL    Total NET: -425 mL      30 Mar 2021 07:01  -  30 Mar 2021 14:06  --------------------------------------------------------  IN:    Sodium Chloride 0.9% Bolus: 500 mL  Total IN: 500 mL    OUT:    Voided (mL): 0 mL  Total OUT: 0 mL    Total NET: 500 mL        I&O's Summary    29 Mar 2021 07:01  -  30 Mar 2021 07:00  --------------------------------------------------------  IN: 2200 mL / OUT: 2625 mL / NET: -425 mL    30 Mar 2021 07:01  -  30 Mar 2021 14:06  --------------------------------------------------------  IN: 500 mL / OUT: 0 mL / NET: 500 mL        PHYSICAL EXAM:  General: NAD, pt is comfortably sitting up in bed, A&O x3, on RA  HEENT: CN II-XII grossly intact, PERRL 3mm, EOMI b/l, + L facial asymmetry, tongue midline, neck FROM  Cardiovascular: RRR, normal S1 and S2   Respiratory: lungs CTAB, no wheezing, rhonchi, or crackles   GI: normoactive BS to auscultation, abd soft, NTND   Neuro: no aphasia, speech clear, no dysmetria, no pronator drift  SOAS x4 spontaneously, RUE/RLE/LLE strength 5/5, LUE strength 4+/5    SILT throughout   Extremities: distal pulses 2+ x4   Wound/incision: R groin site C/D/I       TUBES/LINES:  [] CVC  [] A-line  [] Lumbar Drain  [] Ventriculostomy  [] Other    DIET:  [] NPO  [x] Mechanical  [] Tube feeds    LABS:                        11.7   11.39 )-----------( 177      ( 30 Mar 2021 06:02 )             36.3     03-30    142  |  111<H>  |  19  ----------------------------<  107<H>  3.9   |  23  |  1.03    Ca    8.2<L>      30 Mar 2021 06:02  Phos  2.6     03-30  Mg     2.0     03-30    TPro  5.9<L>  /  Alb  4.0  /  TBili  0.2  /  DBili  x   /  AST  22  /  ALT  19  /  AlkPhos  43  03-29    PT/INR - ( 29 Mar 2021 02:16 )   PT: 11.0 sec;   INR: 0.91          PTT - ( 29 Mar 2021 02:16 )  PTT:25.4 sec    CARDIAC MARKERS ( 29 Mar 2021 06:52 )  x     / <0.01 ng/mL / x     / x     / x          CAPILLARY BLOOD GLUCOSE          Drug Levels: [] N/A    CSF Analysis: [] N/A      Allergies    Compazine (Unknown)    Intolerances      MEDICATIONS:  Antibiotics:    Neuro:  acetaminophen   Tablet .. 650 milliGRAM(s) Oral every 6 hours PRN  ondansetron Injectable 4 milliGRAM(s) IV Push every 8 hours PRN    Anticoagulation:  aspirin  chewable 81 milliGRAM(s) Oral daily  enoxaparin Injectable 40 milliGRAM(s) SubCutaneous every 24 hours    OTHER:  atorvastatin 80 milliGRAM(s) Oral at bedtime  bisacodyl 5 milliGRAM(s) Oral at bedtime  metoprolol tartrate 12.5 milliGRAM(s) Oral every 12 hours  senna 2 Tablet(s) Oral at bedtime    IVF:    CULTURES:    RADIOLOGY & ADDITIONAL TESTS:      ASSESSMENT:    65y Male with Afib not on AC, BPH, with acute onset left sided weakness at 1:30am, presented to ED as stroke code, NIH 14, with right MCA M1 occlusion on CTA, s/p TPA and now s/p cerebral angiogram for mechanical thrombectomy 3/29/21, TICI 0 to TICI 3. Post-TPA CTH on 3/30, no evidence of hemorrhagic transformation, well demarcated R MCA territory infarct present.     ACUTE CVA    No pertinent family history in first degree relatives    Handoff    MEWS Score    Atrial fibrillation    BPH without urinary obstruction    Cerebrovascular accident (CVA) due to occlusion    Cerebrovascular accident (CVA) due to occlusion    Acute CVA (cerebrovascular accident)    Acute CVA (cerebrovascular accident)    Atrial fibrillation, unspecified type    BPH without urinary obstruction    Angiogram, cerebral, with thrombectomy    History of hernia repair    STROKE SYMPTOMS    SysAdmin_VisitLink        PLAN:  Neuro:  q4 stroke neuro checks,  pain control PRN,   Tylenol PRN  Stroke neurology work-up, core measures as ordered  CT Head 24h post TPA completed, results as above   Resume ASA, begin elliquis 4/1    Cardiovascular:  -150,  Daily labs, electrolyte repletion PRN  TTE w/ bubble  Statin therapy for core measures    Pulmonary:  Saturating well on RA    Renal:  Family to provide home BPH meds,    GI:  regular diet   Stool softeners PRN    ID:  Afebrile    Endo:  ISS, HGB A1C 6.1%     DVT PROPHYLAXIS:  [x] Venodynes                                [x] Heparin/Lovenox    FALL RISK:  [] Low Risk                                    [] Impulsive    DISPOSITION:   transfer to stroke neurology stepdown, full code,  PT/OT evaluation   Follow up with Dr. Pisano outpatient   Assessment and plan discussed w/ Dr. Pisano, Dr. Mckeon, and Dr. Piedra     Assessment:  Present when checked    []  GCS  E   V  M     Heart Failure: []Acute, [] acute on chronic , []chronic  Heart Failure:  [] Diastolic (HFpEF), [] Systolic (HFrEF), []Combined (HFpEF and HFrEF), [] RHF, [] Pulm HTN, [] Other    [] LEEANNA, [] ATN, [] AIN, [] other  [] CKD1, [] CKD2, [] CKD 3, [] CKD 4, [] CKD 5, []ESRD    Encephalopathy: [] Metabolic, [] Hepatic, [] toxic, [] Neurological, [] Other    Abnormal Nurtitional Status: [] malnurtition (see nutrition note), [ ]underweight: BMI < 19, [] morbid obesity: BMI >40, [] Cachexia    [] Sepsis  [] hypovolemic shock,[] cardiogenic shock, [] hemorrhagic shock, [] neuogenic shock  [] Acute Respiratory Failure  []Cerebral edema, [] Brain compression/ herniation,   [] Functional quadriplegia  [] Acute blood loss anemia

## 2021-03-30 NOTE — PROVIDER CONTACT NOTE (OTHER) - SITUATION
Pts systolic blood pressure 104 to 108, which is less than ordered parameter of  to 150.
BP 93/59 with MAP of 70.

## 2021-03-30 NOTE — OCCUPATIONAL THERAPY INITIAL EVALUATION ADULT - DIAGNOSIS, OT EVAL
Patient presents with slight LUE/LLE weakness, L sided lean preference, L sided neglect, LUE dyskinesia and deficits with fine motor control,ecreased core strength, trunk control, sitting/standing balance coordination, and activity tolerance impacting independence with functional activities.

## 2021-03-30 NOTE — OCCUPATIONAL THERAPY INITIAL EVALUATION ADULT - PLANNED THERAPY INTERVENTIONS, OT EVAL
ADL retraining/balance training/fine motor coordination training/motor coordination training/neuromuscular re-education/strengthening/transfer training

## 2021-03-30 NOTE — PHYSICAL THERAPY INITIAL EVALUATION ADULT - MODALITIES TREATMENT COMMENTS
smile: L facial droop. tongue protrusion: midline, Cheek puff: less prominent on L. visual tracking (H test): +nystagmus L/R end range visual fields. Visual field test (quadrant test): WNL B/L. No DDK noted, finger opposition intact.

## 2021-03-30 NOTE — PROGRESS NOTE ADULT - ASSESSMENT
66 y/o male with PMH Afib (not on AC due to low CHADS VASC score) and BPH who was BIBEMS for fall caused by new onset left sided weakness. Found to have R. M1 occlusion s/p tPA and thrombectomy    Problem/Plan - 1:Stroke: R. M1 occlusion s/p tPA and thrombectomy  - q4h neurochecks  - post-tPA CTH stable  - c/w lipitor 80, asa 81 and plans for eliquis starting 4/1, c/w lovenox ppx in mean time  - LE doppler negative for DVT  - Echo, lipid profile  - A1c 6.1    lovenox ppx  and ASA after CTH tonight  SBP goal - permissive up to 180  PT/Ot eval  regular diet 64 y/o male with PMH Afib (not on AC due to low CHADS VASC score) and BPH who was BIBEMS for fall caused by new onset left sided weakness. Found to have R. M1 occlusion s/p tPA and thrombectomy    Problem/Plan - 1: Stroke: R. M1 occlusion s/p tPA and thrombectomy  - q4h neurochecks  - post-tPA CTH stable  - c/w lipitor 80, asa 81 and plans for eliquis starting 4/1, c/w lovenox ppx in mean time  - LE doppler negative for DVT  - Echo, lipid profile  - A1c 6.1  - Pt/Ot eval    Problem/Plan - 2: Afib on toprol XL 25 qd  - Metoprolol 12.5 BID  - start FD eliquis 4/1  - has been jason but w/o symtpoms    Problem/Plan - 3: BPH  - hold alpha blocker 2/2 to low BP's, can c/w if symptomatic    Diet: full  PPX: lovenox ppx  Gi: none

## 2021-03-30 NOTE — PROGRESS NOTE ADULT - SUBJECTIVE AND OBJECTIVE BOX
64 y/o male with PMH Afib (not on AC due to low CHADS VASC score) and BPH who was BIBEMS for fall caused by new onset left sided weakness. Last known well 10:30 PM on 3/28. Upon arrival to the ED, patient's NIHSS 14. Imaging showing high attenuating thrombus within the right M1 segment on the noncontrast CT head, upright occlusion of the right M1 branch on the CTA head and neck, and perfusion defect in the R MCA territory. TPA infusion started at 02:45 and patient now s/p mechanical thrombectomy for right M1 occlusion. Patient clinically improving with improving neuro exam and stable for step down to tele    SUBJECTIVE:   Patient seen and examined at bedside. No comlpaints    OBJECTIVE:    VITAL SIGNS:  ICU Vital Signs Last 24 Hrs  T(C): 36.8 (30 Mar 2021 01:04), Max: 37.1 (29 Mar 2021 14:50)  T(F): 98.3 (30 Mar 2021 01:04), Max: 98.7 (29 Mar 2021 14:50)  HR: 84 (30 Mar 2021 13:00) (58 - 118)  BP: 104/55 (30 Mar 2021 13:00) (86/54 - 104/72)  BP(mean): 74 (30 Mar 2021 13:00) (65 - 82)  ABP: 101/58 (30 Mar 2021 02:00) (101/58 - 109/56)  ABP(mean): 70 (30 Mar 2021 02:00) (70 - 80)  RR: 16 (30 Mar 2021 13:00) (15 - 19)  SpO2: 94% (30 Mar 2021 13:00) (94% - 99%)        03-29 @ 07:01  -  03-30 @ 07:00  --------------------------------------------------------  IN: 2200 mL / OUT: 2625 mL / NET: -425 mL    03-30 @ 07:01  -  03-30 @ 13:57  --------------------------------------------------------  IN: 500 mL / OUT: 0 mL / NET: 500 mL      CAPILLARY BLOOD GLUCOSE  130 (29 Mar 2021 02:54)      POCT Blood Glucose.: 160 mg/dL (29 Mar 2021 11:15)      PHYSICAL EXAM:  GENERAL: NAD, lying in bed comfortably  HEAD:  Atraumatic, Normocephalic  EYES: EOMI, PERRLA, conjunctiva and sclera clear  ENT: Moist mucous membranes  NECK: Supple, No JVD  CHEST/LUNG: Clear to auscultation bilaterally; No rales, rhonchi, wheezing, or rubs. Unlabored respirations  HEART: Regular rate and rhythm; No murmurs, rubs, or gallops  ABDOMEN: BSx4; Soft, nontender, nondistended  EXTREMITIES:  2+ Peripheral Pulses, brisk capillary refill. No clubbing, cyanosis, or edema  Mental status: awake, alert, oriented to person, place, and time. Speech is fluent, able to name objects. Follows commands. Attention/concentration intact. No dysarthria, no aphasia.  Cranial nerves:   II: visual fields are full to confrontation. pupils equally round and reactive to light,   III, IV, VI: EOMI without nystagmus  V:  V1-V3 sensation intact,   VII: normal eye closure.  VIII: hearing is intact to finger rub  Motor: Normal bulk and tone, strength 4/5 in left UE and 5/5 in left LE, 5/5 in right UE/LE,  strength 4/5 left, 5/5 right. +Left pronator drift  Sensation: intact to light touch. +Left sided neglect.  Coordination: Possible left sided dysmetria on finger-to-nose may be attributed to weakness       MEDICATIONS:  MEDICATIONS  (STANDING):  aspirin  chewable 81 milliGRAM(s) Oral daily  atorvastatin 80 milliGRAM(s) Oral at bedtime  bisacodyl 5 milliGRAM(s) Oral at bedtime  enoxaparin Injectable 40 milliGRAM(s) SubCutaneous every 24 hours  metoprolol tartrate 12.5 milliGRAM(s) Oral every 12 hours  senna 2 Tablet(s) Oral at bedtime    MEDICATIONS  (PRN):  acetaminophen   Tablet .. 650 milliGRAM(s) Oral every 6 hours PRN Temp greater or equal to 38.5C (101.3F), Moderate Pain (4 - 6)  ondansetron Injectable 4 milliGRAM(s) IV Push every 8 hours PRN Nausea and/or Vomiting      ALLERGIES:  Allergies    Compazine (Unknown)    Intolerances        LABS:                        11.7   11.39 )-----------( 177      ( 30 Mar 2021 06:02 )             36.3     03-30    142  |  111<H>  |  19  ----------------------------<  107<H>  3.9   |  23  |  1.03    Ca    8.2<L>      30 Mar 2021 06:02  Phos  2.6     03-30  Mg     2.0     03-30    TPro  5.9<L>  /  Alb  4.0  /  TBili  0.2  /  DBili  x   /  AST  22  /  ALT  19  /  AlkPhos  43  03-29    PT/INR - ( 29 Mar 2021 02:16 )   PT: 11.0 sec;   INR: 0.91          PTT - ( 29 Mar 2021 02:16 )  PTT:25.4 sec      RADIOLOGY & ADDITIONAL TESTS: Reviewed. 64 y/o male with PMH Afib (not on AC due to low CHADS VASC score) and BPH who was BIBEMS for fall caused by new onset left sided weakness. Last known well 10:30 PM on 3/28. Upon arrival to the ED, patient's NIHSS 14. Imaging showing high attenuating thrombus within the right M1 segment on the noncontrast CT head, upright occlusion of the right M1 branch on the CTA head and neck, and perfusion defect in the R MCA territory. TPA infusion started at 02:45 and patient now s/p mechanical thrombectomy for right M1 occlusion. Patient clinically improving with improving neuro exam and stable for step down to tele    SUBJECTIVE:   Patient seen and examined at bedside. No comlpaints    OBJECTIVE:    VITAL SIGNS:  ICU Vital Signs Last 24 Hrs  T(C): 36.8 (30 Mar 2021 01:04), Max: 37.1 (29 Mar 2021 14:50)  T(F): 98.3 (30 Mar 2021 01:04), Max: 98.7 (29 Mar 2021 14:50)  HR: 84 (30 Mar 2021 13:00) (58 - 118)  BP: 104/55 (30 Mar 2021 13:00) (86/54 - 104/72)  BP(mean): 74 (30 Mar 2021 13:00) (65 - 82)  ABP: 101/58 (30 Mar 2021 02:00) (101/58 - 109/56)  ABP(mean): 70 (30 Mar 2021 02:00) (70 - 80)  RR: 16 (30 Mar 2021 13:00) (15 - 19)  SpO2: 94% (30 Mar 2021 13:00) (94% - 99%)        03-29 @ 07:01  -  03-30 @ 07:00  --------------------------------------------------------  IN: 2200 mL / OUT: 2625 mL / NET: -425 mL    03-30 @ 07:01  -  03-30 @ 13:57  --------------------------------------------------------  IN: 500 mL / OUT: 0 mL / NET: 500 mL      CAPILLARY BLOOD GLUCOSE  130 (29 Mar 2021 02:54)      POCT Blood Glucose.: 160 mg/dL (29 Mar 2021 11:15)      PHYSICAL EXAM:  GENERAL: NAD, lying in bed comfortably  HEAD:  Atraumatic, Normocephalic  EYES: EOMI, PERRLA, conjunctiva and sclera clear  ENT: Moist mucous membranes  NECK: Supple, No JVD  CHEST/LUNG: Clear to auscultation bilaterally; No rales, rhonchi, wheezing, or rubs. Unlabored respirations  HEART: Regular rate and rhythm; No murmurs, rubs, or gallops  ABDOMEN: BSx4; Soft, nontender, nondistended  EXTREMITIES:  2+ Peripheral Pulses, brisk capillary refill. No clubbing, cyanosis, or edema  Mental status: awake, alert, oriented to person, place, and time. Speech is fluent, able to name objects. Follows commands. Attention/concentration intact. No dysarthria, no aphasia.  Cranial nerves:   II: visual fields are full to confrontation. pupils equally round and reactive to light,   III, IV, VI: EOMI without nystagmus  V:  V1-V3 sensation intact,   VII: normal eye closure., left mouth drop  VIII: hearing is intact to finger rub  Motor: Normal bulk and tone, strength 4+/5 in left UE and 5/5 in left LE, 5/5 in right UE/LE,  strength 4+/5 left, 5/5 right. +Left pronator drift  Sensation: intact to light touch.     MEDICATIONS:  MEDICATIONS  (STANDING):  aspirin  chewable 81 milliGRAM(s) Oral daily  atorvastatin 80 milliGRAM(s) Oral at bedtime  bisacodyl 5 milliGRAM(s) Oral at bedtime  enoxaparin Injectable 40 milliGRAM(s) SubCutaneous every 24 hours  metoprolol tartrate 12.5 milliGRAM(s) Oral every 12 hours  senna 2 Tablet(s) Oral at bedtime    MEDICATIONS  (PRN):  acetaminophen   Tablet .. 650 milliGRAM(s) Oral every 6 hours PRN Temp greater or equal to 38.5C (101.3F), Moderate Pain (4 - 6)  ondansetron Injectable 4 milliGRAM(s) IV Push every 8 hours PRN Nausea and/or Vomiting      ALLERGIES:  Allergies    Compazine (Unknown)    Intolerances        LABS:                        11.7   11.39 )-----------( 177      ( 30 Mar 2021 06:02 )             36.3     03-30    142  |  111<H>  |  19  ----------------------------<  107<H>  3.9   |  23  |  1.03    Ca    8.2<L>      30 Mar 2021 06:02  Phos  2.6     03-30  Mg     2.0     03-30    TPro  5.9<L>  /  Alb  4.0  /  TBili  0.2  /  DBili  x   /  AST  22  /  ALT  19  /  AlkPhos  43  03-29    PT/INR - ( 29 Mar 2021 02:16 )   PT: 11.0 sec;   INR: 0.91          PTT - ( 29 Mar 2021 02:16 )  PTT:25.4 sec      RADIOLOGY & ADDITIONAL TESTS: Reviewed.

## 2021-03-30 NOTE — PROGRESS NOTE ADULT - ASSESSMENT
Pt is a 64 yo M w/ PMH afib (not on prior anticoagulation) and BPH who was BIBEMS for L sided weakness and L facial droop after waking up at 01:30 om 3/29. Pt stated he went to sleep around 10/10:30 on 3/28. CTH showed significant hyperdense material within the R M1 segment consistent with CTA findings of abrupt occlusion at the right M1 segment. CTP showed perfusion defect in the R MCA territory with mismatch volume of 127 mL. tPa was administered at 02:33 3/29, neurosx was consulted and pt was taken for angiogram and then underwent successful mechanical thrombectomy with TICI 0 to 3. PT was admitted to NSICU as per protocol for post angio/tPa monitoring. Repeat HCT 24 hrs s/p tPA showed focal areas Focal areas of hypodensity in the right MCA territory, as described above. No hemorrhagic transformation seen. Mild effacement of the right lateral ventricle without hydrocephalus. No midline shift. Areas of hypodensity in the right caudate head and right lentiform nucleus as well as in the right subinsular region and right temporal lobe consistent with evolution of recent infarction.     1) Post tPA monitoring   Pt to be stepped down to [facility] for further management and treatment s/p tPa   -Discuss recommendations for starting Eliquis 5 mg PO for afib  -Start neuro checks q 4 hrs    2) Secondary stroke prevention  -Continue Atorvastatin 80 mg PO   -Follow up ECHO results     3) Further workup   -Stroke neuro checks q4 hours  -SBP goal 110/150 as per neurosx   -Stroke education    DVT Prophylaxis   Lovenox 40 mg and SCDs  Pt is a 66 yo M w/ PMH afib (not on prior anticoagulation) and BPH who was BIBEMS for L sided weakness and L facial droop after waking up at 01:30 om 3/29. Pt stated he went to sleep around 10/10:30 on 3/28. CTH showed significant hyperdense material within the R M1 segment consistent with CTA findings of abrupt occlusion at the right M1 segment. CTP showed perfusion defect in the R MCA territory with mismatch volume of 127 mL. tPa was administered at 02:33 3/29, neurosx was consulted and pt was taken for angiogram and then underwent successful mechanical thrombectomy with TICI 0 to 3. PT was admitted to NSICU as per protocol for post angio/tPa monitoring. Repeat HCT 24 hrs s/p tPA showed focal areas Focal areas of hypodensity in the right MCA territory, as described above. No hemorrhagic transformation seen. Mild effacement of the right lateral ventricle without hydrocephalus. No midline shift. Areas of hypodensity in the right caudate head and right lentiform nucleus as well as in the right subinsular region and right temporal lobe consistent with evolution of recent infarction.     1) Post tPA monitoring   Pt to be stepped down to [facility] for further management and treatment s/p tPa   -Discuss recommendations for starting Eliquis 5 mg PO for afib  -Start neuro checks q 4 hrs    2) Secondary stroke prevention  -Continue Atorvastatin 80 mg PO   -Follow up ECHO results     3) Further workup   -Stroke neuro checks q4 hours  -SBP goal 110/150 as per neurosx   -Stroke education    DVT Prophylaxis   SCDs  Pt is a 66 yo M w/ PMH afib (not on prior anticoagulation) and BPH who was BIBEMS for L sided weakness and L facial droop after waking up at 01:30 om 3/29. Pt stated he went to sleep around 10/10:30 on 3/28. CTH showed significant hyperdense right M1 and CTA confirmed right M1 cutoff, CTA with right MCA territory. tPa was administered at 02:33 3/29, and pt s/p thrombectomy TICI 0 to 3. PT was admitted to NSICU as per protocol for post angio/tPa monitoring. Repeat HCT 24 hrs s/p tPA showed focal areas of hypodensity in the right MCA territory with no hemorrhage. Pt stable for stepdown to stroke unit.    1) Post tPA monitoring   Pt to be stepped down to [facility] for further management and treatment s/p tPa   -Discuss recommendations for starting Eliquis 5 mg PO for afib  -Start neuro checks q 4 hrs    2) Secondary stroke prevention  -Continue Atorvastatin 80 mg PO   -Follow up ECHO results     3) Further workup   -Stroke neuro checks q4 hours  -SBP goal 110-150   -Stroke education    DVT Prophylaxis   SCDs, and either eliquis or lovenox sq pending discussion.

## 2021-03-30 NOTE — PROGRESS NOTE ADULT - SUBJECTIVE AND OBJECTIVE BOX
HPI:  64 y/o male with PMH Afib (not on AC) and BPH who was BIBEMS for fall caused by new onset left sided weakness since waking up at 1:30 AM this morning. Last known well 10:30 PM on 3/28. No head trauma or LOC.  As per wife at bedside, after falling, she noticed patient was weak on his left side, had a left facial droop and was slurring his speech. Upon arrival to the ED, patient's NIHSS 14. Pt denies headache, blurry vision, dizziness, chest pain, palpitations, abdominal pain, nausea, vomiting. Imaging showing high attenuating thrombus within the right M1 segment on the noncontrast CT head, upright occlusion of the right M1 branch on the CTA head and neck, and perfusion defect in the R MCA territory. TPA infusion started at 02:45 and patient now s/p mechanical thrombectomy for right M1 occlusion.    NIHSS in ER: 14  GCS: 15    S/Overnight events:  CHARMAINE overnight, neuro exam stable. CTH 24hrs post-TPA completed, read shows demarcated R MCA territory infarct, no MLS, some mass effect on R lateral ventricle.       Hospital Course:   3/29 - Presented to ED in rapid Afib, collapsed at home, NIH 14 on stroke code. CTA with right m1 occlusion. Received TPA and taken for angiography and mechanical thrombectomy, admitted to NSICU post procedure.  3/30: POD#1 s/p mechanical thrombectomy, TICI 0 --> TICI 3. CHARMAINE overnight, CTH 24hrs post-TPA completed, read shows demarcated R MCA territory infarct, no MLS, some mass effect on R lateral ventricle. F/u anticoagulation plan with Dr. Pisano, per stroke service recommending Eliquis 5mg BID.         Vital Signs Last 24 Hrs  T(C): 36.8 (30 Mar 2021 01:04), Max: 37.1 (29 Mar 2021 14:50)  T(F): 98.3 (30 Mar 2021 01:04), Max: 98.7 (29 Mar 2021 14:50)  HR: 58 (30 Mar 2021 05:00) (50 - 118)  BP: 104/60 (30 Mar 2021 05:00) (91/56 - 112/62)  BP(mean): 74 (30 Mar 2021 05:00) (65 - 81)  RR: 19 (30 Mar 2021 05:00) (15 - 19)  SpO2: 96% (30 Mar 2021 05:00) (95% - 100%)    I&O's Detail    28 Mar 2021 07:01  -  29 Mar 2021 07:00  --------------------------------------------------------  IN:    sodium chloride 0.9%: 300 mL  Total IN: 300 mL    OUT:    Voided (mL): 300 mL  Total OUT: 300 mL    Total NET: 0 mL      29 Mar 2021 07:01  -  30 Mar 2021 05:55  --------------------------------------------------------  IN:    Oral Fluid: 250 mL    sodium chloride 0.9%: 700 mL    Sodium Chloride 0.9% Bolus: 1000 mL  Total IN: 1950 mL    OUT:    Voided (mL): 2275 mL  Total OUT: 2275 mL    Total NET: -325 mL        I&O's Summary    28 Mar 2021 07:01  -  29 Mar 2021 07:00  --------------------------------------------------------  IN: 300 mL / OUT: 300 mL / NET: 0 mL    29 Mar 2021 07:01  -  30 Mar 2021 05:55  --------------------------------------------------------  IN: 1950 mL / OUT: 2275 mL / NET: -325 mL        PHYSICAL EXAM:  General: NAD, pt is comfortably sitting up in bed, A&O x3, on RA  HEENT: CN II-XII grossly intact, PERRL 3mm, EOMI b/l, + L facial asymmetry, tongue midline, neck FROM  Cardiovascular: RRR, normal S1 and S2   Respiratory: lungs CTAB, no wheezing, rhonchi, or crackles   GI: normoactive BS to auscultation, abd soft, NTND   Neuro: no aphasia, speech clear, no dysmetria, no pronator drift  SOSA x4 spontaneously, RUE/RLE/LLE strength 5/5, LUE strength 4+/5    SILT throughout   Extremities: distal pulses 2+ x4   Wound/incision: R groin site C/D/I     TUBES/LINES:  [] CVC  [X] A-line  [] Lumbar Drain  [] Ventriculostomy  [] Other    DIET:  [] NPO  [X] Mechanical  [] Tube feeds    LABS:                        12.2   8.54  )-----------( 186      ( 29 Mar 2021 07:38 )             37.7     03-29    140  |  106  |  25<H>  ----------------------------<  149<H>  4.2   |  21<L>  |  1.15    Ca    8.8      29 Mar 2021 07:38  Phos  2.7     03-29  Mg     1.9     03-29    TPro  5.9<L>  /  Alb  4.0  /  TBili  0.2  /  DBili  x   /  AST  22  /  ALT  19  /  AlkPhos  43  03-29    PT/INR - ( 29 Mar 2021 02:16 )   PT: 11.0 sec;   INR: 0.91          PTT - ( 29 Mar 2021 02:16 )  PTT:25.4 sec    CARDIAC MARKERS ( 29 Mar 2021 06:52 )  x     / <0.01 ng/mL / x     / x     / x          CAPILLARY BLOOD GLUCOSE      POCT Blood Glucose.: 160 mg/dL (29 Mar 2021 11:15)  POCT Blood Glucose.: 115 mg/dL (29 Mar 2021 06:28)      Drug Levels: [] N/A    CSF Analysis: [] N/A      Allergies    Compazine (Unknown)    Intolerances      MEDICATIONS:  Antibiotics:    Neuro:  acetaminophen   Tablet .. 650 milliGRAM(s) Oral every 6 hours PRN  ondansetron Injectable 4 milliGRAM(s) IV Push every 8 hours PRN    Anticoagulation:    OTHER:  atorvastatin 80 milliGRAM(s) Oral at bedtime  bisacodyl 5 milliGRAM(s) Oral at bedtime  metoprolol tartrate 12.5 milliGRAM(s) Oral every 12 hours  senna 2 Tablet(s) Oral at bedtime    IVF:    CULTURES:    RADIOLOGY & ADDITIONAL TESTS:  < from: CT Head No Cont (03.30.21 @ 02:14) >    IMPRESSION:    Focal areas of hypodensity in the right MCA territory, as described above. No hemorrhagic transformation seen. Mild effacement of the right lateral ventricle without hydrocephalus. No midline shift.        ASSESSMENT:  65y Male with Afib not on AC, BPH, with acute onset left sided weakness at 1:30am, presented to ED as stroke code, NIH 14, with right MCA M1 occlusion on CTA, s/p TPA and now s/p cerebral angiogram for mechanical thrombectomy 3/29/21, TICI 0 to TICI 3. Post-TPA CTH on 3/30, no evidence of hemorrhagic transformation, well demarcated R MCA territory infarct present.       ACUTE CVA    No pertinent family history in first degree relatives    Handoff    MEWS Score    BPH without urinary obstruction    Atrial fibrillation    Cerebrovascular accident (CVA) due to occlusion    Cerebrovascular accident (CVA) due to occlusion    Acute CVA (cerebrovascular accident)    BPH without urinary obstruction    Atrial fibrillation, unspecified type    Acute CVA (cerebrovascular accident)    Angiogram, cerebral, with thrombectomy    History of hernia repair    STROKE SYMPTOMS    [email or web address]        PLAN:  Neuro:  q1 stroke neuro checks,  pain control PRN,   Tylenol PRN  Stroke neurology work-up, core measures as ordered  CT Head 24h post TPA completed, results as above     Cardiovascular:  SBP 1100-150,  Daily labs, electrolyte repletion PRN  TTE w/ bubble  Statin therapy for core measures    Pulmonary:  Saturating well on RA    Renal:  Voiding, condom cath PRN  Family to provide home BPH meds,    GI:  regular diet   Stool softeners PRN    ID:  Afebrile    Endo:  ISS, HGB A1C 6.1%     DVT Prophylaxis:  SCDs  LE dopplers  f/u anticoagulation plans post TPA stability scan     Disposition:  Continue NSICU care, full code,  PT/OT evaluation when clinically appropriate,  Assessment and plan discussed w/ Dr. Pisano, Dr. Mckeon, and Dr. Piedra       Assessment:  Present when checked    []  GCS  E   V  M     Heart Failure: []Acute, [] acute on chronic , []chronic  Heart Failure:  [] Diastolic (HFpEF), [] Systolic (HFrEF), []Combined (HFpEF and HFrEF), [] RHF, [] Pulm HTN, [] Other    [] LEEANNA, [] ATN, [] AIN, [] other  [] CKD1, [] CKD2, [] CKD 3, [] CKD 4, [] CKD 5, []ESRD    Encephalopathy: [] Metabolic, [] Hepatic, [] toxic, [] Neurological, [] Other    Abnormal Nurtitional Status: [] malnurtition (see nutrition note), [ ]underweight: BMI < 19, [] morbid obesity: BMI >40, [] Cachexia    [] Sepsis  [] hypovolemic shock,[] cardiogenic shock, [] hemorrhagic shock, [] neuogenic shock  [] Acute Respiratory Failure  []Cerebral edema, [] Brain compression/ herniation,   [] Functional quadriplegia  [] Acute blood loss anemia

## 2021-03-30 NOTE — PROVIDER CONTACT NOTE (OTHER) - ASSESSMENT
Neuro status at baseline. Extremities warm, pulses palpable.
Neuro status at baseline. Asymptomatic.

## 2021-03-30 NOTE — PHYSICAL THERAPY INITIAL EVALUATION ADULT - ADDITIONAL COMMENTS
pt states that he lives w/ his wife in an elevator access apt building w/ no stairs to enter. Denies use of DME for ambulation prior to this admission. Denies hx of recent falls. States that he was independent in all ADLs prior to this admission

## 2021-03-30 NOTE — PHYSICAL THERAPY INITIAL EVALUATION ADULT - IMPAIRMENTS CONTRIBUTING TO GAIT DEVIATIONS, PT EVAL
impaired balance/impaired coordination/impaired motor control/pain/impaired postural control/decreased strength

## 2021-03-30 NOTE — OCCUPATIONAL THERAPY INITIAL EVALUATION ADULT - ADDITIONAL COMMENTS
Patient reports living with his wife in an elevator access apartment building with no JEMMA. Patient states he was independent with all ADL's and functional mobility prior to hospitalization with no AD. Patient reports he has not had any recent falls.

## 2021-03-30 NOTE — PHYSICAL THERAPY INITIAL EVALUATION ADULT - PERTINENT HX OF CURRENT PROBLEM, REHAB EVAL
65y Male with Afib not on AC, BPH, with acute onset left sided weakness at 1:30am, presented to ED as stroke code, NIH 14, with right MCA M1 occlusion on CTA, s/p TPA and now s/p cerebral angiogram for mechanical thrombectomy 3/29/21, TICI 0 to TICI 3. Post-TPA CTH on 3/30, no evidence of hemorrhagic transformation, well demarcated R MCA territory infarct present.

## 2021-03-30 NOTE — OCCUPATIONAL THERAPY INITIAL EVALUATION ADULT - MD ORDER
66 y/o m with PMH Afib (not on AC) and BPH who was BIBEMS for fall caused by new onset L sided weakness since waking up at 1:30 AM. Last known well 10:30 PM on 3/28. No head trauma or LOC.  As per wife at bedside, after falling, she noticed pt was weak on his L side, had a L facial droop slurring his speech. TPA infusion started at 02:45 3/29/21 and patient now s/p mechanical thrombectomy for right M1 occlusion.

## 2021-03-30 NOTE — PHYSICAL THERAPY INITIAL EVALUATION ADULT - IMPAIRMENTS FOUND, PT EVAL
aerobic capacity/endurance/cranial and peripheral nerve integrity/decreased midline orientation/fine motor/gait, locomotion, and balance/gross motor/joint integrity and mobility/muscle strength/neuromotor development and sensory integration/poor safety awareness/posture/visual motor

## 2021-03-30 NOTE — PHYSICAL THERAPY INITIAL EVALUATION ADULT - GAIT DEVIATIONS NOTED, PT EVAL
decreased fracisco/increased time in double stance/decreased step length/decreased weight-shifting ability

## 2021-03-30 NOTE — PROGRESS NOTE ADULT - SUBJECTIVE AND OBJECTIVE BOX
Neurology Stroke Progress Note    INTERVAL HPI/OVERNIGHT EVENTS: No acute events overnight. Pt is in no acute distress and currently has no complaints at this time. He states he is doing well and is paying more attention to his left side. Pt is inquiring about acute rehab and when he will leave.     MEDICATIONS  (STANDING):  aspirin  chewable 81 milliGRAM(s) Oral daily  atorvastatin 80 milliGRAM(s) Oral at bedtime  bisacodyl 5 milliGRAM(s) Oral at bedtime  metoprolol tartrate 12.5 milliGRAM(s) Oral every 12 hours  senna 2 Tablet(s) Oral at bedtime    MEDICATIONS  (PRN):  acetaminophen   Tablet .. 650 milliGRAM(s) Oral every 6 hours PRN Temp greater or equal to 38.5C (101.3F), Moderate Pain (4 - 6)  ondansetron Injectable 4 milliGRAM(s) IV Push every 8 hours PRN Nausea and/or Vomiting      Allergies    Compazine (Unknown)    Intolerances        ROS: As per HPI, otherwise negative    Vital Signs Last 24 Hrs  T(C): 36.8 (30 Mar 2021 01:04), Max: 37.1 (29 Mar 2021 14:50)  T(F): 98.3 (30 Mar 2021 01:04), Max: 98.7 (29 Mar 2021 14:50)  HR: 65 (30 Mar 2021 08:27) (58 - 118)  BP: 96/54 (30 Mar 2021 08:27) (91/56 - 104/60)  BP(mean): 67 (30 Mar 2021 08:27) (65 - 76)  RR: 16 (30 Mar 2021 08:27) (15 - 19)  SpO2: 97% (30 Mar 2021 08:27) (95% - 99%)    Physical exam:  General: awake and alert, sitting comfortably, no acute distress    Neurologic:  Mental status: awake, alert, oriented to person, place, and time. Speech is fluent, able to name objects. Follows commands. Attention/concentration intact. No dysarthria, no aphasia.  Cranial nerves:   II: visual fields are full to confrontation. pupils equally round and reactive to light,   III, IV, VI: EOMI without nystagmus  V:  V1-V3 sensation intact,   VII: normal eye closure. +facial droop   VIII: hearing is intact to finger rub  Motor: Normal bulk and tone, strength 4/5 in left UE and 5/5 in left LE, 5/5 in right UE/LE,  strength 4/5 left, 5/5 right. +Left pronator drift  Sensation: intact to light touch. +Left sided neglect.  Coordination: Possible left sided dysmetria on finger-to-nose may be attributed to weakness       LABS:                        11.7   11.39 )-----------( 177      ( 30 Mar 2021 06:02 )             36.3     03-30    142  |  111<H>  |  19  ----------------------------<  107<H>  3.9   |  23  |  1.03    Ca    8.2<L>      30 Mar 2021 06:02  Phos  2.6     03-30  Mg     2.0     03-30    TPro  5.9<L>  /  Alb  4.0  /  TBili  0.2  /  DBili  x   /  AST  22  /  ALT  19  /  AlkPhos  43  03-29    PT/INR - ( 29 Mar 2021 02:16 )   PT: 11.0 sec;   INR: 0.91          PTT - ( 29 Mar 2021 02:16 )  PTT:25.4 sec      RADIOLOGY & ADDITIONAL TESTS:  < from: CT Head No Cont (03.30.21 @ 02:14) >  FINDINGS:    There are focal areas of hypodensity in the right caudate head and right lentiform nucleus as well as in the right subinsular region and right temporal lobe consistent with evolution of recent infarction    IMPRESSION:    Focal areas of hypodensity in the right MCA territory, as described above. No hemorrhagic transformation seen. Mild effacement of the right lateral ventricle without hydrocephalus. No midline shift.

## 2021-03-30 NOTE — PROGRESS NOTE ADULT - SUBJECTIVE AND OBJECTIVE BOX
***    Neurocritical Care Attending    ***       64 y/o male with PMH Afib (not on AC due to low CHADS VASC score) and BPH who was BIBEMS for fall caused by new onset left sided weakness since waking up at 1:30 AM this morning. Last known well 10:30 PM on 3/28. No head trauma or LOC.  As per wife at bedside, after falling, she noticed patient was weak on his left side, had a left facial droop and was slurring his speech. Upon arrival to the ED, patient's NIHSS 14. Pt denies headache, blurry vision, dizziness, chest pain, palpitations, abdominal pain, nausea, vomiting. Imaging showing high attenuating thrombus within the right M1 segment on the noncontrast CT head, upright occlusion of the right M1 branch on the CTA head and neck, and perfusion defect in the R MCA territory. TPA infusion started at 02:45 and patient now s/p mechanical thrombectomy for right M1 occlusion.    Postop course: doing well, improving exam, no complications    Gen:   CV: RRR  Pulm: CTA b/l  Abd: soft NTND    MSE: awake, alert, oriented x3, follows multistep commands, attention normal, language fluent   CN: MAURICIO, EOMI, VFF, face with mild left lower facial, TUP midline, no dysarthria  Motor: left arm antigravity but with (improved) pronator drift other wise full strength  Sensory: intact to LT throughout     Allergies: Compazine (Unknown)      EXAM:  VITALS:  T(C): 36.8 (03-30-21 @ 01:04), Max: 37.1 (03-29-21 @ 14:50)  HR: 76 (03-30-21 @ 11:01) (58 - 118)  BP: 95/67 (03-30-21 @ 11:01) (86/54 - 104/60)  RR: 16 (03-30-21 @ 11:00) (15 - 19)  SpO2: 95% (03-30-21 @ 11:01) (95% - 99%)    MEDICATIONS:  acetaminophen   Tablet .. 650 milliGRAM(s) Oral every 6 hours PRN  aspirin  chewable 81 milliGRAM(s) Oral daily  atorvastatin 80 milliGRAM(s) Oral at bedtime  bisacodyl 5 milliGRAM(s) Oral at bedtime  metoprolol tartrate 12.5 milliGRAM(s) Oral every 12 hours  ondansetron Injectable 4 milliGRAM(s) IV Push every 8 hours PRN  senna 2 Tablet(s) Oral at bedtime      I/O's    03-29-21 @ 07:01  -  03-30-21 @ 07:00  --------------------------------------------------------  IN: 2200 mL / OUT: 2625 mL / NET: -425 mL    03-30-21 @ 07:01  -  03-30-21 @ 11:29  --------------------------------------------------------  IN: 500 mL / OUT: 0 mL / NET: 500 mL        LABS:                          11.7   11.39 )-----------( 177      ( 30 Mar 2021 06:02 )             36.3     03-30    142  |  111<H>  |  19  ----------------------------<  107<H>  3.9   |  23  |  1.03    Ca    8.2<L>      30 Mar 2021 06:02  Phos  2.6     03-30  Mg     2.0     03-30    TPro  5.9<L>  /  Alb  4.0  /  TBili  0.2  /  DBili  x   /  AST  22  /  ALT  19  /  AlkPhos  43  03-29    PT/INR - ( 29 Mar 2021 02:16 )   PT: 11.0 sec;   INR: 0.91          PTT - ( 29 Mar 2021 02:16 )  PTT:25.4 sec    CARDIAC MARKERS ( 29 Mar 2021 06:52 )  x     / <0.01 ng/mL / x     / x     / x          CAPILLARY BLOOD GLUCOSE

## 2021-03-31 ENCOUNTER — TRANSCRIPTION ENCOUNTER (OUTPATIENT)
Age: 66
End: 2021-03-31

## 2021-03-31 LAB
ANION GAP SERPL CALC-SCNC: 10 MMOL/L — SIGNIFICANT CHANGE UP (ref 5–17)
BUN SERPL-MCNC: 18 MG/DL — SIGNIFICANT CHANGE UP (ref 7–23)
CALCIUM SERPL-MCNC: 8.9 MG/DL — SIGNIFICANT CHANGE UP (ref 8.4–10.5)
CHLORIDE SERPL-SCNC: 108 MMOL/L — SIGNIFICANT CHANGE UP (ref 96–108)
CO2 SERPL-SCNC: 23 MMOL/L — SIGNIFICANT CHANGE UP (ref 22–31)
CREAT SERPL-MCNC: 1.02 MG/DL — SIGNIFICANT CHANGE UP (ref 0.5–1.3)
GLUCOSE SERPL-MCNC: 94 MG/DL — SIGNIFICANT CHANGE UP (ref 70–99)
HCT VFR BLD CALC: 40.7 % — SIGNIFICANT CHANGE UP (ref 39–50)
HGB BLD-MCNC: 13 G/DL — SIGNIFICANT CHANGE UP (ref 13–17)
MAGNESIUM SERPL-MCNC: 2.1 MG/DL — SIGNIFICANT CHANGE UP (ref 1.6–2.6)
MCHC RBC-ENTMCNC: 30 PG — SIGNIFICANT CHANGE UP (ref 27–34)
MCHC RBC-ENTMCNC: 31.9 GM/DL — LOW (ref 32–36)
MCV RBC AUTO: 94 FL — SIGNIFICANT CHANGE UP (ref 80–100)
NRBC # BLD: 0 /100 WBCS — SIGNIFICANT CHANGE UP (ref 0–0)
PHOSPHATE SERPL-MCNC: 3.2 MG/DL — SIGNIFICANT CHANGE UP (ref 2.5–4.5)
PLATELET # BLD AUTO: 173 K/UL — SIGNIFICANT CHANGE UP (ref 150–400)
POTASSIUM SERPL-MCNC: 3.8 MMOL/L — SIGNIFICANT CHANGE UP (ref 3.5–5.3)
POTASSIUM SERPL-SCNC: 3.8 MMOL/L — SIGNIFICANT CHANGE UP (ref 3.5–5.3)
RBC # BLD: 4.33 M/UL — SIGNIFICANT CHANGE UP (ref 4.2–5.8)
RBC # FLD: 13.2 % — SIGNIFICANT CHANGE UP (ref 10.3–14.5)
SODIUM SERPL-SCNC: 141 MMOL/L — SIGNIFICANT CHANGE UP (ref 135–145)
WBC # BLD: 8.54 K/UL — SIGNIFICANT CHANGE UP (ref 3.8–10.5)
WBC # FLD AUTO: 8.54 K/UL — SIGNIFICANT CHANGE UP (ref 3.8–10.5)

## 2021-03-31 PROCEDURE — 99231 SBSQ HOSP IP/OBS SF/LOW 25: CPT

## 2021-03-31 PROCEDURE — 93306 TTE W/DOPPLER COMPLETE: CPT | Mod: 26

## 2021-03-31 PROCEDURE — 99223 1ST HOSP IP/OBS HIGH 75: CPT

## 2021-03-31 RX ORDER — POLYETHYLENE GLYCOL 3350 17 G/17G
17 POWDER, FOR SOLUTION ORAL AT BEDTIME
Refills: 0 | Status: DISCONTINUED | OUTPATIENT
Start: 2021-03-31 | End: 2021-04-01

## 2021-03-31 RX ORDER — APIXABAN 2.5 MG/1
5 TABLET, FILM COATED ORAL EVERY 12 HOURS
Refills: 0 | Status: DISCONTINUED | OUTPATIENT
Start: 2021-03-31 | End: 2021-04-01

## 2021-03-31 RX ADMIN — Medication 12.5 MILLIGRAM(S): at 06:21

## 2021-03-31 RX ADMIN — Medication 12.5 MILLIGRAM(S): at 18:27

## 2021-03-31 RX ADMIN — Medication 5 MILLIGRAM(S): at 21:19

## 2021-03-31 RX ADMIN — ATORVASTATIN CALCIUM 80 MILLIGRAM(S): 80 TABLET, FILM COATED ORAL at 21:19

## 2021-03-31 RX ADMIN — SENNA PLUS 2 TABLET(S): 8.6 TABLET ORAL at 21:19

## 2021-03-31 RX ADMIN — POLYETHYLENE GLYCOL 3350 17 GRAM(S): 17 POWDER, FOR SOLUTION ORAL at 21:19

## 2021-03-31 RX ADMIN — APIXABAN 5 MILLIGRAM(S): 2.5 TABLET, FILM COATED ORAL at 12:36

## 2021-03-31 NOTE — CONSULT NOTE ADULT - SUBJECTIVE AND OBJECTIVE BOX
Patient is a 65y old  Male who presents with a chief complaint of Right M1 Occlusion (31 Mar 2021 13:00)    66 y/o male with PMH Afib (not on AC due to low CHADS VASC score) and BPH who was BIBEMS for fall caused by new onset left sided weakness. Last known well 10:30 PM on 3/28. Upon arrival to the ED, patient's NIHSS 14. Imaging showing high attenuating thrombus within the right M1 segment on the noncontrast CT head, upright occlusion of the right M1 branch on the CTA head and neck, and perfusion defect in the R MCA territory. TPA infusion started at 02:45 and patient now s/p mechanical thrombectomy for right M1 occlusion. Patient clinically improving with improving neuro exam and stable for step down to tele    INTERVAL HPI/OVERNIGHT EVENTS:  Patient was seen and examined at bedside. As per nurse and patient, no o/n events, patient resting comfortably. No complaints at this time. He endorses not being aware of the weakness on the left side. He does feel as though his overall strength is improving. Notes that he had a small BM this AM but was hard in nature and he feels constipated. Patient denies: fever, chills, dizziness, weakness, HA, Changes in vision, CP, palpitations, SOB, cough, N/V/D/C, dysuria, LE edema.      PAST MEDICAL & SURGICAL HISTORY:  Atrial fibrillation    BPH without urinary obstruction    History of hernia repair        SOCIAL HISTORY  Alcohol: 3-4 drinks a week  Tobacco: none  Illicit substance use: none    T(C): 36.7 (03-31-21 @ 10:08), Max: 37.3 (03-31-21 @ 00:13)  HR: 76 (03-31-21 @ 11:55) (67 - 99)  BP: 105/69 (03-31-21 @ 11:55) (90/54 - 117/73)  RR: 18 (03-31-21 @ 11:55) (16 - 18)  SpO2: 96% (03-31-21 @ 11:55) (93% - 97%)  Wt(kg): --  I&O's Summary    30 Mar 2021 07:01  -  31 Mar 2021 07:00  --------------------------------------------------------  IN: 500 mL / OUT: 775 mL / NET: -275 mL        PHYSICAL EXAM:  GENERAL: NAD, laying comfortably in bed  HEAD:  Atraumatic, Normocephalic  EYES: EOMI, PERRLA, conjunctiva and sclera clear  ENMT: No tonsillar erythema, exudates, or enlargement; MMM  NECK: Supple, No JVD  NERVOUS SYSTEM:  Alert & Oriented X3, strength 4+/5 in LUE and LLE, 5/5 in right UE/LE, Leans on left side when sitting down  CHEST/LUNG: Clear to percussion bilaterally; No rales, rhonchi, wheezing, or rubs  HEART: Regular rate and rhythm; No murmurs, rubs, or gallops  ABDOMEN: Soft, Nontender, Nondistended; Bowel sounds present  EXTREMITIES:  2+ Peripheral Pulses, No clubbing, cyanosis, or edema  LYMPH: No lymphadenopathy noted  SKIN: No rashes or lesions        LABS:                        13.0   8.54  )-----------( 173      ( 31 Mar 2021 06:54 )             40.7     03-31    141  |  108  |  18  ----------------------------<  94  3.8   |  23  |  1.02    Ca    8.9      31 Mar 2021 06:54  Phos  3.2     03-31  Mg     2.1     03-31          CAPILLARY BLOOD GLUCOSE                MEDICATIONS  (STANDING):  apixaban 5 milliGRAM(s) Oral every 12 hours  atorvastatin 80 milliGRAM(s) Oral at bedtime  bisacodyl 5 milliGRAM(s) Oral at bedtime  metoprolol tartrate 12.5 milliGRAM(s) Oral every 12 hours  polyethylene glycol 3350 17 Gram(s) Oral at bedtime  senna 2 Tablet(s) Oral at bedtime    MEDICATIONS  (PRN):  acetaminophen   Tablet .. 650 milliGRAM(s) Oral every 6 hours PRN Temp greater or equal to 38.5C (101.3F), Moderate Pain (4 - 6)  ondansetron Injectable 4 milliGRAM(s) IV Push every 8 hours PRN Nausea and/or Vomiting      RADIOLOGY & ADDITIONAL TESTS:    Imaging Personally Reviewed:  [ ] YES  [ ] NO    Consultant(s) Notes Reviewed:  [ ] YES  [ ] NO    Care Discussed with Consultants/Other Providers [ ] YES  [ ] NO

## 2021-03-31 NOTE — DISCHARGE NOTE PROVIDER - CARE PROVIDER_API CALL
Jonh Pisano)  Neurology; Vascular Neurology  [address] [place] [address]  Phone: [phone]  Fax: [phone]  Follow Up Time:

## 2021-03-31 NOTE — DISCHARGE NOTE PROVIDER - NSDCCPCAREPLAN_GEN_ALL_CORE_FT
PRINCIPAL DISCHARGE DIAGNOSIS  Diagnosis: Acute CVA (cerebrovascular accident)  Assessment and Plan of Treatment: You were admitted for a stroke; the blood clot in your brain was removed by neurosurgery. Your symptoms kept improving, and physical and occupational therapy worked with you daily.  We started you on atorvastatin and eliquis; please continue taking these medications upon discharge and follow up with both your primary care doctor and Dr. Pisano (neurologist) in the next 1-2 weeks.  If you experience persistent headaches, changes in vision, slurred speech, numbness, weakness, confusion, please return immediately to your nearest ED.       PRINCIPAL DISCHARGE DIAGNOSIS  Diagnosis: Acute CVA (cerebrovascular accident)  Assessment and Plan of Treatment: You were admitted for a stroke; the blood clot in your brain was removed by neurosurgery. Your symptoms kept improving, and physical and occupational therapy worked with you daily.  We started you on atorvastatin and eliquis; please continue taking these medications upon discharge and follow up with both your primary care doctor and Dr. Pisano (neurologist) in the next 1-2 weeks.  We also gave you a prescription for physio- and occupational therapy to continue at home.  If you experience persistent headaches, changes in vision, slurred speech, numbness, weakness, confusion, please return immediately to your nearest ED.

## 2021-03-31 NOTE — PROGRESS NOTE ADULT - SUBJECTIVE AND OBJECTIVE BOX
HISTORY OF PRESENT ILLNESS:       PAST MEDICAL & SURGICAL HISTORY:  Atrial fibrillation    BPH without urinary obstruction    History of hernia repair      MEDICATIONS:  Antibiotics:    Neuro:  acetaminophen   Tablet .. 650 milliGRAM(s) Oral every 6 hours PRN  ondansetron Injectable 4 milliGRAM(s) IV Push every 8 hours PRN    Anticoagulation:  aspirin  chewable 81 milliGRAM(s) Oral daily  enoxaparin Injectable 40 milliGRAM(s) SubCutaneous every 24 hours    OTHER:  atorvastatin 80 milliGRAM(s) Oral at bedtime  bisacodyl 5 milliGRAM(s) Oral at bedtime  metoprolol tartrate 12.5 milliGRAM(s) Oral every 12 hours  senna 2 Tablet(s) Oral at bedtime      Vital Signs Last 24 Hrs  T(C): 36.7 (31 Mar 2021 10:08), Max: 37.3 (31 Mar 2021 00:13)  T(F): 98.1 (31 Mar 2021 10:08), Max: 99.1 (31 Mar 2021 00:13)  HR: 72 (31 Mar 2021 08:43) (67 - 99)  BP: 110/75 (31 Mar 2021 08:43) (90/54 - 117/73)  BP(mean): 88 (31 Mar 2021 08:43) (63 - 90)  RR: 18 (31 Mar 2021 08:43) (16 - 18)  SpO2: 97% (31 Mar 2021 08:43) (93% - 97%)    PHYSICAL EXAM:  Constitutional:, A&Ox4, nt  General:  Pleasant interactive, speech clear and fluent, NAD, pt is comfortably sitting up in bed, A&O x4, on RA  HEENT: CN II-XII grossly intact, PERRLA 3mm, EOMI b/l, + L lower facial asymmetry(improved from previous day) , tongue midline, neck FROM  Cardiovascular: RRR, normal S1 and S2   Respiratory: lungs CTAB, no wheezing, rhonchi, or crackles   GI: normoactive BS to auscultation, abd soft, NTND   Neuro: no aphasia, no dysmetria in UE and LE, trace L pronator drift  SOSA x4 spontaneously, RUE/RLE 5/5, LUE strength 5-/5, LLE 4+/5 KE/HF/ABD, 5/5 DF/EHL/FHL/PF/KF. Absent DTR L4/S1 b/l    SILT throughout   Extremities: distal pulses 2+ x4   Wound/incision: R groin site C/D/I     LABS:                        13.0   8.54  )-----------( 173      ( 31 Mar 2021 06:54 )             40.7     03-31    141  |  108  |  18  ----------------------------<  94  3.8   |  23  |  1.02    Ca    8.9      31 Mar 2021 06:54  Phos  3.2     03-31  Mg     2.1     03-31      RADIOLOGY & ADDITIONAL STUDIES: HPI:  66 y/o male with PMH Afib (not on AC) and BPH who was BIBEMS for fall caused by new onset left sided weakness since waking up at 1:30 AM this morning. Last known well 10:30 PM on 3/28. No head trauma or LOC.  As per wife at bedside, after falling, she noticed patient was weak on his left side, had a left facial droop and was slurring his speech. Upon arrival to the ED, patient's NIHSS 14. Pt denies headache, blurry vision, dizziness, chest pain, palpitations, abdominal pain, nausea, vomiting. Imaging showing high attenuating thrombus within the right M1 segment on the noncontrast CT head, upright occlusion of the right M1 branch on the CTA head and neck, and perfusion defect in the R MCA territory. TPA infusion started at 02:45 and patient now s/p mechanical thrombectomy for right M1 occlusion.    NIHSS in ER: 14  GCS: 15    Hospital Course:   3/29 - Presented to ED in rapid Afib, collapsed at home, NIH 14 on stroke code. CTA with right m1 occlusion. Received TPA and taken for angiography and mechanical thrombectomy, admitted to NSICU post procedure.  3/30: POD#1 s/p mechanical thrombectomy, TICI 0 --> TICI 3. CHARMAINE overnight, CTH 24hrs post-TPA completed, read shows demarcated R MCA territory infarct, no MLS, some mass effect on R lateral ventricle. OK to resume ASA per Dr. araya but hold off on elliquis until 4/1.   3/31: POD#2 CHARMAINE overnight. No complaints today. Has resumed ASA daily dosage. Holding elequis until 4/1.    Neuro:  acetaminophen   Tablet .. 650 milliGRAM(s) Oral every 6 hours PRN  ondansetron Injectable 4 milliGRAM(s) IV Push every 8 hours PRN    Anticoagulation:  aspirin  chewable 81 milliGRAM(s) Oral daily  enoxaparin Injectable 40 milliGRAM(s) SubCutaneous every 24 hours    OTHER:  atorvastatin 80 milliGRAM(s) Oral at bedtime  bisacodyl 5 milliGRAM(s) Oral at bedtime  metoprolol tartrate 12.5 milliGRAM(s) Oral every 12 hours  senna 2 Tablet(s) Oral at bedtime      Vital Signs Last 24 Hrs  T(C): 36.7 (31 Mar 2021 10:08), Max: 37.3 (31 Mar 2021 00:13)  T(F): 98.1 (31 Mar 2021 10:08), Max: 99.1 (31 Mar 2021 00:13)  HR: 72 (31 Mar 2021 08:43) (67 - 99)  BP: 110/75 (31 Mar 2021 08:43) (90/54 - 117/73)  BP(mean): 88 (31 Mar 2021 08:43) (63 - 90)  RR: 18 (31 Mar 2021 08:43) (16 - 18)  SpO2: 97% (31 Mar 2021 08:43) (93% - 97%)    PHYSICAL EXAM:  Constitutional:, A&Ox4, nt  General:  Pleasant interactive, speech clear and fluent, NAD, pt is comfortably sitting up in bed, A&O x4, on RA  HEENT: CN II-XII grossly intact, PERRLA 3mm, EOMI b/l, + L lower facial asymmetry(improved from previous day) , tongue midline, neck FROM  Cardiovascular: RRR, normal S1 and S2   Respiratory: lungs CTAB, no wheezing, rhonchi, or crackles   GI: normoactive BS to auscultation, abd soft, NTND   Neuro: no aphasia, no dysmetria in UE and LE, trace L pronator drift  SOSA x4 spontaneously, RUE/RLE 5/5, LUE strength 5-/5, LLE 4+/5 KE/HF/ABD, 5/5 DF/EHL/FHL/PF/KF. Absent DTR L4/S1 b/l    SILT throughout  Extremities: distal pulses 2+ x4   Wound/incision: R groin site C/D/I       TUBES/LINES:  [] CVC  [] A-line  [] Lumbar Drain  [] Ventriculostomy  [] Other    DIET:  [] NPO  [x] Mechanical  [] Tube feeds    LABS:                        13.0   8.54  )-----------( 173      ( 31 Mar 2021 06:54 )             40.7     03-31    141  |  108  |  18  ----------------------------<  94  3.8   |  23  |  1.02    Ca    8.9      31 Mar 2021 06:54  Phos  3.2     03-31  Mg     2.1     03-31      RADIOLOGY & ADDITIONAL STUDIES:    CT Head non-contrast 3/30/2021    FINDINGS:    There are focal areas of hypodensity in the right caudate head and right lentiform nucleus as well as in the right subinsular region and right temporal lobe consistent with evolution of recent infarction. There is mild effacement of the frontal horn of the right lateral ventricle as well as mild effacement of the right sylvian fissure. The remaining sulci are normal in appearance. No hydrocephalus is seen. There is no midline shift. There is no hemorrhagic transformation or other acute intracranial hemorrhage. Previously seen hyperdense right MCA sign is less conspicuous on this examination.   The paranasal sinuses are clear. The mastoid air cells are well ventilated.    IMPRESSION:    Focal areas of hypodensity in the right MCA territory, as described above. No hemorrhagic transformation seen. Mild effacement of the right lateral ventricle without hydrocephalus. No midline shift.    ASSESSMENT:    65y Male with Afib not on AC's, BPH, with acute onset left sided weakness at 1:30am, presented to ED as stroke code, NIH 14, with right MCA M1 occlusion on CTA, s/p TPA and now s/p cerebral angiogram for mechanical thrombectomy 3/29/21, TICI 0 to TICI 3. Post-TPA CTH on 3/30, no evidence of hemorrhagic transformation, well demarcated R MCA territory infarct present. Patient is now afebrile with stable vitals, satting well on RA, tolerating a regular diet with no complaints.      Plan:   Continue plan per Neurology primary team. Neurosurgery signing out; consult prn.      DVT PROPHYLAXIS:  [x] Venodynes                                [x] Heparin/Lovenox

## 2021-03-31 NOTE — DISCHARGE NOTE PROVIDER - HOSPITAL COURSE
#Discharge: do not delete    Patient is __ yo M/F with past medical history of _____  Presented with _____, found to have _____  Problem List/Inpatient treatment course:     66 y/o male with PMH Afib (not on AC due to low CHADS VASC score) and BPH who was BIBEMS for fall caused by new onset left sided weakness. Found to have R. M1 occlusion s/p tPA and thrombectomy    #Stroke: R. M1 occlusion s/p tPA and thrombectomy  - q4h neurochecks during admission  - post-tPA CTH stable  - c/w lipitor 80, asa 81 and eliquis starting 3/31; lovenox ppx before  - LE doppler negative for DVT  - Echo s/f dilated RA, RV, borderline dilated aortic root; no right to left shunt, normal LV size and function; no thrombus  - A1c 6.1, lipid profile wnl  - PT/OT followed    #Afib on toprol XL 25 qd  - Metoprolol 12.5 BID  - has been jason but w/o symptoms  - started on Eliquis 5mg BID 3/31    #BPH  - hold alpha blocker 2/2 to low BP's, can c/w if symptomatic      New medications: lipitor 80, asa 81 and eliquis 5mg BID  Labs to be followed outpatient: none  Exam to be followed outpatient: none   #Discharge: do not delete    Mr. Mills is a 66 y/o male with PMH Afib (not on AC due to low CHADS VASC score) and BPH who was BIBEMS for fall caused by new onset left sided weakness. Found to have R. M1 occlusion s/p tPA and thrombectomy    Problem List/Inpatient treatment course    #Stroke: R. M1 occlusion s/p tPA and thrombectomy  - q4h neurochecks during admission  - post-tPA CTH stable  - c/w lipitor 80 and eliquis starting 3/31; lovenox ppx before  - LE doppler negative for DVT  - Echo s/f dilated RA, RV, borderline dilated aortic root; no right to left shunt, normal LV size and function; no thrombus  - A1c 6.1, lipid profile wnl  - PT/OT followed    #Afib on toprol XL 25 qd  - Metoprolol 12.5 BID  - has been jason but w/o symptoms  - started on Eliquis 5mg BID 3/31    #BPH  - hold alpha blocker 2/2 to low BP's, can c/w if symptomatic      New medications: lipitor 80 and eliquis 5mg BID  Labs to be followed outpatient: none  Exam to be followed outpatient: none   #Discharge: do not delete    Mr. Mills is a 64 y/o male with PMH Afib (not on AC due to low CHADS VASC score) and BPH who was BIBEMS for fall caused by new onset left sided weakness. Found to have R. M1 occlusion s/p tPA and thrombectomy    Problem List/Inpatient treatment course    #Stroke: R. M1 occlusion s/p tPA and thrombectomy  - q4h neurochecks during admission  - post-tPA CTH stable  - c/w lipitor 80 and eliquis starting 3/31; lovenox ppx before  - LE doppler negative for DVT  - Echo s/f dilated RA, RV, borderline dilated aortic root; no right to left shunt, normal LV size and function; no thrombus  - A1c 6.1, lipid profile wnl  - PT/OT following; prescription for home PT/OT given    #Afib on toprol XL 25 qd  - Metoprolol 12.5 BID  - has been jason but w/o symptoms  - started on Eliquis 5mg BID 3/31    #BPH  - hold alpha blocker 2/2 to low BP's, can c/w if symptomatic      New medications: lipitor 80 and eliquis 5mg BID  Labs to be followed outpatient: none  Exam to be followed outpatient: none

## 2021-03-31 NOTE — CONSULT NOTE ADULT - ASSESSMENT
per Neurology    64 y/o male with PMH Afib (not on AC due to low CHADS VASC score) and BPH who was BIBEMS for fall caused by new onset left sided weakness. Found to have R. M1 occlusion s/p tPA and thrombectomy    Problem/Plan - 1: Stroke: R. M1 occlusion s/p tPA and thrombectomy  - q4h neurochecks  - post-tPA CTH stable  - c/w lipitor 80, asa 81 and plans for eliquis starting 4/1, c/w lovenox ppx in mean time  - LE doppler negative for DVT  - Echo, lipid profile  - A1c 6.1  - Pt/Ot eval    Problem/Plan - 2: Afib on toprol XL 25 qd  - Metoprolol 12.5 BID  - start FD eliquis 4/1  - has been jason but w/o symtpoms    Problem/Plan - 3: BPH  - hold alpha blocker 2/2 to low BP's, can c/w if symptomatic    Diet: full  PPX: lovenox ppx  Gi: none

## 2021-03-31 NOTE — PROGRESS NOTE ADULT - ASSESSMENT
64 y/o male with PMH Afib (not on AC due to low CHADS VASC score) and BPH who was BIBEMS for fall caused by new onset left sided weakness. Found to have R. M1 occlusion s/p tPA and thrombectomy    Problem/Plan - 1: Stroke: R. M1 occlusion s/p tPA and thrombectomy  - q4h neurochecks  - post-tPA CTH stable  - c/w lipitor 80, asa 81 and plans for eliquis starting 4/1, c/w lovenox ppx in mean time  - LE doppler negative for DVT  - Echo pending  - A1c 6.1, lipid profile wnl  - Pt/Ot eval  - pending AR placement    Problem/Plan - 2: Afib on toprol XL 25 qd  - Metoprolol 12.5 BID  - has been jason but w/o symptoms  - started on Eliquis 5mg BID 3/31    Problem/Plan - 3: BPH  - hold alpha blocker 2/2 to low BP's, can c/w if symptomatic    Diet: full  PPX: eliquis 5mg BID  Gi: none

## 2021-03-31 NOTE — PROGRESS NOTE ADULT - SUBJECTIVE AND OBJECTIVE BOX
CC: Patient is a 65y old  Male who presents with a chief complaint of Right M1 Occlusion (31 Mar 2021 10:56)      OVERNIGHT EVENTS: CHARMAINE.    SUBJECTIVE / INTERVAL HPI: Patient seen and examined at bedside. Patient states he feels good, denies any pain, numbness. States he feels his strength is improving. No other complaints.    ROS: negative unless otherwise stated above.    VITAL SIGNS:  Vital Signs Last 24 Hrs  T(C): 36.7 (31 Mar 2021 10:08), Max: 37.3 (31 Mar 2021 00:13)  T(F): 98.1 (31 Mar 2021 10:08), Max: 99.1 (31 Mar 2021 00:13)  HR: 72 (31 Mar 2021 08:43) (67 - 99)  BP: 110/75 (31 Mar 2021 08:43) (90/54 - 117/73)  BP(mean): 88 (31 Mar 2021 08:43) (63 - 90)  RR: 18 (31 Mar 2021 08:43) (16 - 18)  SpO2: 97% (31 Mar 2021 08:43) (93% - 97%)    PHYSICAL EXAM:     GENERAL: NAD, lying in bed comfortably  HEAD:  Atraumatic, Normocephalic  EYES: EOMI, PERRLA, conjunctiva and sclera clear  ENT: Moist mucous membranes  NECK: Supple, No JVD  CHEST/LUNG: Clear to auscultation bilaterally; No rales, rhonchi, wheezing, or rubs. Unlabored respirations  HEART: Regular rate and rhythm; No murmurs, rubs, or gallops  ABDOMEN: BSx4; Soft, nontender, nondistended  EXTREMITIES:  2+ Peripheral Pulses, brisk capillary refill. No clubbing, cyanosis, or edema  Mental status: awake, alert, oriented to person, place, and time. Speech is fluent, able to name objects. Follows commands. Attention/concentration intact. No dysarthria, no aphasia.  Cranial nerves:   II: visual fields are full to confrontation. pupils equally round and reactive to light,   III, IV, VI: EOMI without nystagmus  V:  V1-V3 sensation intact,   VII: normal eye closure., left mouth drop  VIII: hearing is intact to finger rub  Motor: Normal bulk and tone, strength 4+/5 in left UE and 5/5 in left LE, 5/5 in right UE/LE,  strength 5/5 left, 5/5 right. +Left pronator drift; leans a little to the left when standing up  Sensation: intact to light touch.     MEDICATIONS:  MEDICATIONS  (STANDING):  apixaban 5 milliGRAM(s) Oral every 12 hours  atorvastatin 80 milliGRAM(s) Oral at bedtime  bisacodyl 5 milliGRAM(s) Oral at bedtime  metoprolol tartrate 12.5 milliGRAM(s) Oral every 12 hours  polyethylene glycol 3350 17 Gram(s) Oral at bedtime  senna 2 Tablet(s) Oral at bedtime    MEDICATIONS  (PRN):  acetaminophen   Tablet .. 650 milliGRAM(s) Oral every 6 hours PRN Temp greater or equal to 38.5C (101.3F), Moderate Pain (4 - 6)  ondansetron Injectable 4 milliGRAM(s) IV Push every 8 hours PRN Nausea and/or Vomiting      ALLERGIES:  Allergies    Compazine (Unknown)    Intolerances        LABS:                        13.0   8.54  )-----------( 173      ( 31 Mar 2021 06:54 )             40.7     03-31    141  |  108  |  18  ----------------------------<  94  3.8   |  23  |  1.02    Ca    8.9      31 Mar 2021 06:54  Phos  3.2     03-31  Mg     2.1     03-31          CAPILLARY BLOOD GLUCOSE          RADIOLOGY & ADDITIONAL TESTS: Reviewed.

## 2021-03-31 NOTE — CONSULT NOTE ADULT - SUBJECTIVE AND OBJECTIVE BOX
Patient is a 65y old  Male who presents with a chief complaint of Right M1 Occlusion (30 Mar 2021 14:01)       HPI:  66 y/o male with PMH Afib (not on AC) and BPH who was BIBEMS for fall caused by new onset left sided weakness since waking up at 1:30 AM this morning. Last known well 10:30 PM on 3/28. No head trauma or LOC.  As per wife at bedside, after falling, she noticed patient was weak on his left side, had a left facial droop and was slurring his speech. Upon arrival to the ED, patient's NIHSS 14. Pt denies headache, blurry vision, dizziness, chest pain, palpitations, abdominal pain, nausea, vomiting. Imaging showing high attenuating thrombus within the right M1 segment on the noncontrast CT head, upright occlusion of the right M1 branch on the CTA head and neck, and perfusion defect in the R MCA territory. TPA infusion started at 02:45 and patient now s/p mechanical thrombectomy for right M1 occlusion.    NIHSS in ER: 14  GCS: 15   (29 Mar 2021 04:07)      PAST MEDICAL & SURGICAL HISTORY:  Atrial fibrillation    BPH without urinary obstruction    History of hernia repair        MEDICATIONS  (STANDING):  aspirin  chewable 81 milliGRAM(s) Oral daily  atorvastatin 80 milliGRAM(s) Oral at bedtime  bisacodyl 5 milliGRAM(s) Oral at bedtime  enoxaparin Injectable 40 milliGRAM(s) SubCutaneous every 24 hours  metoprolol tartrate 12.5 milliGRAM(s) Oral every 12 hours  senna 2 Tablet(s) Oral at bedtime    MEDICATIONS  (PRN):  acetaminophen   Tablet .. 650 milliGRAM(s) Oral every 6 hours PRN Temp greater or equal to 38.5C (101.3F), Moderate Pain (4 - 6)  ondansetron Injectable 4 milliGRAM(s) IV Push every 8 hours PRN Nausea and/or Vomiting        Social History:               -  Home Living Status:  lives with his wife in an elevator accessible apartment building         -  Prior Home Care Services:   none    Baseline Functional Level Prior to Admission:             - ADL's/ IADL's:  independent         - ambulatory status PTA:  walked without assistive devices  FAMILY HISTORY:  No pertinent family history in first degree relatives        CBC Full  -  ( 31 Mar 2021 06:54 )  WBC Count : 8.54 K/uL  RBC Count : 4.33 M/uL  Hemoglobin : 13.0 g/dL  Hematocrit : 40.7 %  Platelet Count - Automated : 173 K/uL  Mean Cell Volume : 94.0 fl  Mean Cell Hemoglobin : 30.0 pg  Mean Cell Hemoglobin Concentration : 31.9 gm/dL  Auto Neutrophil # : x  Auto Lymphocyte # : x  Auto Monocyte # : x  Auto Eosinophil # : x  Auto Basophil # : x  Auto Neutrophil % : x  Auto Lymphocyte % : x  Auto Monocyte % : x  Auto Eosinophil % : x  Auto Basophil % : x      03-31    141  |  108  |  18  ----------------------------<  94  3.8   |  23  |  1.02    Ca    8.9      31 Mar 2021 06:54  Phos  3.2     03-31  Mg     2.1     03-31              Radiology:    < from: CT Head No Cont (03.30.21 @ 02:14) >  EXAM:  CT BRAIN                          PROCEDURE DATE:  03/30/2021          INTERPRETATION:  IArabella MD, have reviewed the images and the report and agree with the findings.    ******PRELIMINARY REPORT******    PROCEDURE: CT head without intravenous contrast    INDICATIONS: Status post tPA. History of stroke.    TECHNIQUE:  Serial axial images were obtained from the skull base to the vertex without the use of intravenous contrast. Coronal and sagittal reconstructions were created.    COMPARISON EXAMINATION: CT head dated 3/29/2021.    FINDINGS:    There are focal areas of hypodensity in the right caudate head and right lentiform nucleus as well as in the right subinsular region and right temporal lobe consistent with evolution of recent infarction. There is mild effacement of the frontal horn of the right lateral ventricle as well as mild effacement of the right sylvian fissure. The remaining sulci are normal in appearance. No hydrocephalus is seen. There is no midline shift. There is no hemorrhagic transformation or other acute intracranial hemorrhage. Previously seen hyperdense right MCA sign is less conspicuous on this examination.    The paranasal sinuses are clear. The mastoid air cells are well ventilated.    IMPRESSION:    Focal areas of hypodensity in the right MCA territory, as described above. No hemorrhagic transformation seen. Mild effacement of the right lateral ventricle without hydrocephalus. No midline shift.        < from: CT Perfusion w/ Maps w/ IV Cont (03.29.21 @ 02:27) >  EXAM:  CT PERFUSION W MAPS IC                          PROCEDURE DATE:  03/29/2021          INTERPRETATION:  PROCEDURE: CT perfusion with contrast.    INDICATION: Left-sided neck left and paresis.    TECHNIQUE:    Following the intravenous administration of?40 ml of Optiray 350, serial axial images were obtained through the brain. The CT perfusion data set was post processed per [facility] protocol generating color maps of CBF, CBV, MTT, and Tmax.    COMPARISON: None.    FINDINGS:    There is decreased CBF less than 30% measuring 63 mL in the right MCA territory with Tmax >6 seconds measuring 190 mL, with a mismatch volume of 127 mL. There is CBV less than 34% measuring 101 mL.    IMPRESSION:    Perfusion defect in the right MCA territory with a mismatch volume of 127 mL.              Vital Signs Last 24 Hrs  T(C): 37.3 (31 Mar 2021 00:13), Max: 37.3 (31 Mar 2021 00:13)  T(F): 99.1 (31 Mar 2021 00:13), Max: 99.1 (31 Mar 2021 00:13)  HR: 72 (31 Mar 2021 08:43) (66 - 99)  BP: 110/75 (31 Mar 2021 08:43) (86/54 - 117/73)  BP(mean): 88 (31 Mar 2021 08:43) (63 - 90)  RR: 18 (31 Mar 2021 08:43) (16 - 18)  SpO2: 97% (31 Mar 2021 08:43) (93% - 97%)    REVIEW OF SYSTEMS:    CONSTITUTIONAL: No fever, weight loss, or fatigue  EYES: No eye pain, visual disturbances, or discharge  ENMT:  No difficulty hearing, tinnitus, vertigo; No sinus or throat pain  NECK: No pain or stiffness  BREASTS: No pain, masses, or nipple discharge  RESPIRATORY: No cough, wheezing, chills or hemoptysis; No shortness of breath  CARDIOVASCULAR: No chest pain, palpitations, dizziness, or leg swelling  GASTROINTESTINAL: No abdominal or epigastric pain. No nausea, vomiting, or hematemesis; No diarrhea or constipation. No melena or hematochezia.  GENITOURINARY: No dysuria, frequency, hematuria, or incontinence  NEUROLOGICAL:  as per HPI  SKIN: No itching, burning, rashes, or lesions   LYMPH NODES: No enlarged glands  ENDOCRINE: No heat or cold intolerance; No hair loss  MUSCULOSKELETAL: No joint pain or swelling; No muscle, back, or extremity pain  PSYCHIATRIC: No depression, anxiety, mood swings, or difficulty sleeping  HEME/LYMPH: No easy bruising, or bleeding gums  ALLERGY AND IMMUNOLOGIC: No hives or eczema  VASCULAR: no swelling, erythema,   : no dysuria, hematuria, frequency        Physical Exam:  66 yo  gentleman lying in semi Upton's position, no acute complaints    Head: normocephalic, atraumatic    Eyes: PERRLA, EOMI, no nystagmus, sclera anicteric    ENT: nasal discharge, uvula midline, no oropharyngeal erythema/exudate    Neck: supple, negative JVD, negative carotid bruits, no thyromegaly    Chest: CTA bilaterally, neg wheeze/ rhonchi/ rales/ crackles/ egophany    Cardiovascular: regular rate and rhythm, neg murmurs/rubs/gallops    Abdomen: soft, non distended, non tender to palpation in all 4 quadrants, negative rebound/guarding, normal bowel sounds    Extremities: WWP, neg cyanosis/clubbing/edema, negative calf tenderness to palpation, negative Maggie's sign    Musculoskeletal:    Skin:      :     Neurologic Exam:    Alert and oriented to person, place, date/year, speech fluent w/o dysarthria, follows commands, recent and remote memory intact, repetition intact, comprehension intact,  attention/concentration intact, fund of knowledge appropriate    Cranial Nerves:     II:                         pupils equal, round and reactive to light, visual fields intact   III/ IV/VI:             extraocular movements intact, neg nystagmus, neg ptosis  V:                        facial sensation intact, V1-3 normal  VII:                      right droop, normal eye closure and smile  VIII:                     hearing intact to finger rub bilaterally  IX and X:             no hoarseness, gag intact, palate/ uvula rise symmetrically  XI:                       SCM/ trapezius strength intact bilateral  XII:                      no tongue deviation    Motor Exam:    Right UE:            : 5/5                             wrist extensors/ flexors: 5/5                             biceps:   5/5                             triceps:  5/5                             deltoid:  5/5                             pronator drift: neg    Left UE:              :  4/5                             wrist extensors/ flexors:  4/5                             biceps:    4/5                             triceps:   4/5                             deltoid:  4-/5                             pronator drift:  slight    Right LE:            dorsiflexors:  4/5                            plantar flexors:  4-/5                            quadriceps:  4/5                            hamstrings:  4/5                            hip flexors:  4-/5    Left LE:              dorsiflexors:  5/5                            plantar flexors:  5/5                            quadriceps:  5/5                            hamstrings:  5/5                            hip flexors:  5/5               Sensation:        Intact to light touch x 4 extremities                           No neglect or extinction on double simultaneous testing.                       DTR:                  biceps/brachioradialis: equal bilaterally                                                      patella/ankle: equal bilaterally                                                     neg clonus                           neg Babinski                        Finger to Nose:            R:  wnl           L: mild dysmetria    Heel to shin: wnl bilaterally          Rapid Alternating movements:            R:  WNL          L:  slight DDK    Joint Position Sense: wnl bilaterally          R:           L:     Romberg:  not tested    Tandem Walking:  not tested    Gait:  not tested        PM&R Impression:    1) s/p acute R MCA territory infarct, s/p t PA and thrombectomy    Plan:    1) Physical therapy focusing on therapeutic exercises, bed mobility/transfer out of bed evaluation, progressive ambulation with assistive devices prn.    2) Anticipated Disposition Plan/Recs:    acute rehab placement

## 2021-03-31 NOTE — CONSULT NOTE ADULT - ASSESSMENT
64 y/o male with PMH Afib and BPH who was BIBEMS for fall caused by new onset left sided weakness. Found to have R. M1 occlusion s/p tPA and thrombectomy. Medicine consulted for comanagement    #M1 Occlusion CVA  s/p tPA and thrombectomy  - plan per primary team   - c/w lipitor 80, asa 81, eliquis started today, monitor for bleeding   - Echo pending  - A1c 6.1, lipid profile wnl  - Pt/Ot eval    #Afib  - CW Lopressor 12.5 BID  - CW Eliquis 5mg BID    #PreDM  - CW lipitor 80mg QD  - Encourage Lifetsyle Modification     #Need for vaccination   - Was due for second Pfizer shot on Monday, however awaiting rehab. Do not have vaccine in house at this time.     #Constipation  - CW bowel regimen, add miralax       66 y/o male with PMH Afib and BPH who was BIBEMS for fall caused by new onset left sided weakness. Found to have R. M1 occlusion s/p tPA and thrombectomy. Medicine consulted for comanagement    #M1 Occlusion CVA  s/p tPA and thrombectomy  - plan per primary team   - c/w lipitor 80, asa 81, eliquis started today, monitor for bleeding   - Echo pending  - A1c 6.1, lipid profile wnl  - Pt/Ot eval    #Afib  - CW Lopressor 12.5 BID  - CW Eliquis 5mg BID    #PreDM  - CW lipitor 80mg QD  - Encourage Lifetsyle Modification     #Need for vaccination   - Was due for second Pfizer shot on Monday, however awaiting rehab. Do not have vaccine in house at this time.     #Constipation  - CW bowel regimen, add miralax    #BPH  Denies sx at this time, on silodosin at home   Recommend resuming at this time   Monitor for urinary sx       66 y/o male with PMH Afib and BPH who was BIBEMS for fall caused by new onset left sided weakness. Found to have R. M1 occlusion s/p tPA and thrombectomy. Medicine consulted for comanagement    #M1 Occlusion CVA  s/p tPA and thrombectomy  - plan per primary team   - c/w lipitor 80, asa 81, eliquis started today, monitor for bleeding   - Echo pending  - A1c 6.1, lipid profile wnl  - Pt/Ot eval    #Afib  - CW Lopressor 12.5 BID  - CW Eliquis 5mg BID    #PreDM  - CW lipitor 80mg QD  - Encourage Lifetsyle Modification     #Need for vaccination   - Was due for second Pfizer shot on Monday, however awaiting rehab. Do not have vaccine in house at this time.     #Constipation  - CW bowel regimen, add miralax    #BPH  Denies sx at this time, on silodosin at home   Held in setting of low BP  Can continue if pt to develop urinary sx

## 2021-03-31 NOTE — DISCHARGE NOTE PROVIDER - NSDCMRMEDTOKEN_GEN_ALL_CORE_FT
Stevenargtimbo Sprinkle 25 mg oral capsule, extended release: 1 cap(s) orally once a day   apixaban 5 mg oral tablet: 1 tab(s) orally every 12 hours  atorvastatin 80 mg oral tablet: 1 tab(s) orally once a day (at bedtime)  Kapspargo Sprinkle 25 mg oral capsule, extended release: 1 cap(s) orally once a day

## 2021-04-01 ENCOUNTER — TRANSCRIPTION ENCOUNTER (OUTPATIENT)
Age: 66
End: 2021-04-01

## 2021-04-01 VITALS — WEIGHT: 164.24 LBS

## 2021-04-01 PROCEDURE — 99233 SBSQ HOSP IP/OBS HIGH 50: CPT | Mod: GC

## 2021-04-01 RX ORDER — APIXABAN 2.5 MG/1
1 TABLET, FILM COATED ORAL
Qty: 60 | Refills: 0
Start: 2021-04-01 | End: 2021-04-30

## 2021-04-01 RX ORDER — ATORVASTATIN CALCIUM 80 MG/1
1 TABLET, FILM COATED ORAL
Qty: 30 | Refills: 0
Start: 2021-04-01 | End: 2021-04-30

## 2021-04-01 RX ADMIN — Medication 12.5 MILLIGRAM(S): at 05:56

## 2021-04-01 RX ADMIN — APIXABAN 5 MILLIGRAM(S): 2.5 TABLET, FILM COATED ORAL at 05:56

## 2021-04-01 NOTE — DIETITIAN INITIAL EVALUATION ADULT. - OTHER CALCULATIONS
ActualBW used for calculations as pt between % of IBW (99% IBW). Needs estimated for age and adjusted for CVA s/p thrombectomy

## 2021-04-01 NOTE — DIETITIAN INITIAL EVALUATION ADULT. - OTHER INFO
64 yo/male with PMHx Afib, BPH, admitted s/p fall 2/2 L. sided weakness 2/2 finding of R. M1 occlusion. S/p tPA and thrombectomy. Pt seen in room, awake, alert, very pleasant. Wife at bedside. At home he endorsed eating very well, no dietary restrictions, generally tries to eat balanced diet. Confirmed NKFA. Observed 100% intake of both breakfast and lunch today. No N/V/C/D reported at this time. Was initially constipated but given bowel regimen w/good relief. He denied difficulty chewing or swallowing. Skin intact pressure-wise; no edema present. Afebrile. No pain/headaches endorsed. General healthy Mediterranean nutrition reviewed and handout provided. Will continue to follow per RD protocol.

## 2021-04-01 NOTE — DISCHARGE NOTE NURSING/CASE MANAGEMENT/SOCIAL WORK - PATIENT PORTAL LINK FT
You can access the FollowMyHealth Patient Portal offered by [facility] by registering at the following website: [email or web address]. By joining [facility]’s FollowMyHealth portal, you will also be able to view your health information using other applications (apps) compatible with our system.

## 2021-04-01 NOTE — PROGRESS NOTE ADULT - ASSESSMENT
64 y/o male with PMH Afib and BPH who was BIBEMS for fall caused by new onset left sided weakness. Found to have R. M1 occlusion s/p tPA and thrombectomy. Medicine consulted for comanagement    #M1 Occlusion CVA  s/p tPA and thrombectomy  - plan per primary team   - c/w lipitor 80, asa 81, eliquis 5mg BID   - Echo WNL  - A1c 6.1, lipid profile wnl  - For acute rehab    #Afib  - CW Lopressor 12.5 BID  - CW Eliquis 5mg BID    #PreDM  - CW lipitor 80mg QD  - Encourage Lifetsyle Modification     #Need for vaccination   - Was due for second Pfizer shot on Monday, however awaiting rehab. Do not have vaccine in house at this time.     #Constipation  - CW bowel regimen    #BPH  Denies sx at this time, on silodosin at home   Held in setting of low BP  Can continue if pt to develop urinary sx

## 2021-04-01 NOTE — PROGRESS NOTE ADULT - SUBJECTIVE AND OBJECTIVE BOX
Patient is a 65y old  Male who presents with a chief complaint of Right M1 Occlusion (31 Mar 2021 17:31)      INTERVAL HPI/OVERNIGHT EVENTS:  Patient was seen and examined at bedside. As per nurse and patient, no o/n events, patient resting comfortably. No complaints at this time. Awaiting rehab. Patient denies: fever, chills, dizziness, weakness, HA, Changes in vision, CP, palpitations, SOB, cough, N/V/D/C, dysuria, changes in bowel movements, LE edema.      PAST MEDICAL & SURGICAL HISTORY:  Atrial fibrillation    BPH without urinary obstruction    History of hernia repair      T(C): 36.1 (04-01-21 @ 09:45), Max: 37.1 (04-01-21 @ 02:00)  HR: 74 (04-01-21 @ 09:25) (72 - 80)  BP: 102/78 (04-01-21 @ 09:25) (92/75 - 113/70)  RR: 18 (04-01-21 @ 08:18) (16 - 18)  SpO2: 97% (04-01-21 @ 09:25) (93% - 98%)  Wt(kg): --  I&O's Summary    31 Mar 2021 07:01  -  01 Apr 2021 07:00  --------------------------------------------------------  IN: 600 mL / OUT: 200 mL / NET: 400 mL        PHYSICAL EXAM:  GENERAL: NAD, laying comfortably in bed  HEAD:  Atraumatic, Normocephalic  EYES: EOMI, PERRLA, conjunctiva and sclera clear  ENMT: No tonsillar erythema, exudates, or enlargement; MMM  NECK: Supple, No JVD  NERVOUS SYSTEM:  Alert & Oriented X3, strength 5-/5 in LUE and LLE, 5/5 in right UE/LE, Leans on left side when sitting down  CHEST/LUNG: Clear to percussion bilaterally; No rales, rhonchi, wheezing, or rubs  HEART: Regular rate and rhythm; No murmurs, rubs, or gallops  ABDOMEN: Soft, Nontender, Nondistended; Bowel sounds present  EXTREMITIES:  2+ Peripheral Pulses, No clubbing, cyanosis, or edema  LYMPH: No lymphadenopathy noted  SKIN: No rashes or lesions        LABS:                        13.0   8.54  )-----------( 173      ( 31 Mar 2021 06:54 )             40.7     03-31    141  |  108  |  18  ----------------------------<  94  3.8   |  23  |  1.02    Ca    8.9      31 Mar 2021 06:54  Phos  3.2     03-31  Mg     2.1     03-31          CAPILLARY BLOOD GLUCOSE                MEDICATIONS  (STANDING):  apixaban 5 milliGRAM(s) Oral every 12 hours  atorvastatin 80 milliGRAM(s) Oral at bedtime  bisacodyl 5 milliGRAM(s) Oral at bedtime  metoprolol tartrate 12.5 milliGRAM(s) Oral every 12 hours  polyethylene glycol 3350 17 Gram(s) Oral at bedtime  senna 2 Tablet(s) Oral at bedtime    MEDICATIONS  (PRN):  acetaminophen   Tablet .. 650 milliGRAM(s) Oral every 6 hours PRN Temp greater or equal to 38.5C (101.3F), Moderate Pain (4 - 6)  ondansetron Injectable 4 milliGRAM(s) IV Push every 8 hours PRN Nausea and/or Vomiting      RADIOLOGY & ADDITIONAL TESTS:    Imaging Personally Reviewed:  [ ] YES  [ ] NO    Consultant(s) Notes Reviewed:  [ ] YES  [ ] NO    Care Discussed with Consultants/Other Providers [ ] YES  [ ] NO

## 2021-04-01 NOTE — DIETITIAN INITIAL EVALUATION ADULT. - NAME AND PHONE
Samantha Gitlin, RD, CDN, [facility], [phone] or available on [email or web address]

## 2021-04-01 NOTE — PROGRESS NOTE ADULT - PROVIDER SPECIALTY LIST ADULT
NSICU
NSICU
Neurology
NSICU
Neurology
Neurosurgery
Hospitalist
Neurosurgery

## 2021-04-01 NOTE — PROGRESS NOTE ADULT - REASON FOR ADMISSION
Right M1 Occlusion

## 2021-04-05 PROBLEM — Z00.00 ENCOUNTER FOR PREVENTIVE HEALTH EXAMINATION: Status: ACTIVE | Noted: 2021-04-05

## 2021-04-08 DIAGNOSIS — R29.810 FACIAL WEAKNESS: ICD-10-CM

## 2021-04-08 DIAGNOSIS — R47.1 DYSARTHRIA AND ANARTHRIA: ICD-10-CM

## 2021-04-08 DIAGNOSIS — I63.9 CEREBRAL INFARCTION, UNSPECIFIED: ICD-10-CM

## 2021-04-08 DIAGNOSIS — K59.00 CONSTIPATION, UNSPECIFIED: ICD-10-CM

## 2021-04-08 DIAGNOSIS — R00.1 BRADYCARDIA, UNSPECIFIED: ICD-10-CM

## 2021-04-08 DIAGNOSIS — G81.94 HEMIPLEGIA, UNSPECIFIED AFFECTING LEFT NONDOMINANT SIDE: ICD-10-CM

## 2021-04-08 DIAGNOSIS — R47.81 SLURRED SPEECH: ICD-10-CM

## 2021-04-08 DIAGNOSIS — I63.311 CEREBRAL INFARCTION DUE TO THROMBOSIS OF RIGHT MIDDLE CEREBRAL ARTERY: ICD-10-CM

## 2021-04-08 DIAGNOSIS — N40.0 BENIGN PROSTATIC HYPERPLASIA WITHOUT LOWER URINARY TRACT SYMPTOMS: ICD-10-CM

## 2021-04-08 DIAGNOSIS — R73.03 PREDIABETES: ICD-10-CM

## 2021-04-08 DIAGNOSIS — R29.714 NIHSS SCORE 14: ICD-10-CM

## 2021-04-08 DIAGNOSIS — I48.91 UNSPECIFIED ATRIAL FIBRILLATION: ICD-10-CM

## 2021-04-12 ENCOUNTER — APPOINTMENT (OUTPATIENT)
Dept: NEUROLOGY | Facility: CLINIC | Age: 66
End: 2021-04-12
Payer: COMMERCIAL

## 2021-04-12 ENCOUNTER — TRANSCRIPTION ENCOUNTER (OUTPATIENT)
Age: 66
End: 2021-04-12

## 2021-04-12 VITALS
DIASTOLIC BLOOD PRESSURE: 77 MMHG | TEMPERATURE: 98 F | WEIGHT: 201.5 LBS | OXYGEN SATURATION: 97 % | HEART RATE: 75 BPM | RESPIRATION RATE: 18 BRPM | HEIGHT: 64 IN | SYSTOLIC BLOOD PRESSURE: 157 MMHG

## 2021-04-12 PROCEDURE — 99214 OFFICE O/P EST MOD 30 MIN: CPT | Mod: 95

## 2021-04-12 NOTE — PRE-OP CHECKLIST - SPO2 (%)
Patient called because she is having major issues with her throat and esophagus burning when she eats. Please call patient at HOME: [phone]

## 2021-04-13 ENCOUNTER — TRANSCRIPTION ENCOUNTER (OUTPATIENT)
Age: 66
End: 2021-04-13

## 2021-04-13 ENCOUNTER — INPATIENT (INPATIENT)
Facility: HOSPITAL | Age: 66
LOS: 2 days | Discharge: HOME | DRG: 460 | End: 2021-04-16
Attending: ORTHOPAEDIC SURGERY | Admitting: ORTHOPAEDIC SURGERY
Payer: OTHER MISCELLANEOUS

## 2021-04-13 DIAGNOSIS — Z90.89 ACQUIRED ABSENCE OF OTHER ORGANS: Chronic | ICD-10-CM

## 2021-04-13 DIAGNOSIS — I10 ESSENTIAL (PRIMARY) HYPERTENSION: ICD-10-CM

## 2021-04-13 DIAGNOSIS — M54.16 RADICULOPATHY, LUMBAR REGION: ICD-10-CM

## 2021-04-13 DIAGNOSIS — Z98.890 OTHER SPECIFIED POSTPROCEDURAL STATES: Chronic | ICD-10-CM

## 2021-04-13 DIAGNOSIS — K40.20 BILATERAL INGUINAL HERNIA, WITHOUT OBSTRUCTION OR GANGRENE, NOT SPECIFIED AS RECURRENT: Chronic | ICD-10-CM

## 2021-04-13 DIAGNOSIS — L85.9 EPIDERMAL THICKENING, UNSPECIFIED: ICD-10-CM

## 2021-04-13 LAB
ANION GAP SERPL CALC-SCNC: 10 MMOL/L — SIGNIFICANT CHANGE UP (ref 5–17)
BASOPHILS # BLD AUTO: 0.02 K/UL — SIGNIFICANT CHANGE UP (ref 0–0.2)
BASOPHILS NFR BLD AUTO: 0.2 % — SIGNIFICANT CHANGE UP (ref 0–2)
BLD GP AB SCN SERPL QL: NEGATIVE — SIGNIFICANT CHANGE UP
BUN SERPL-MCNC: 18 MG/DL — SIGNIFICANT CHANGE UP (ref 7–23)
CALCIUM SERPL-MCNC: 9 MG/DL — SIGNIFICANT CHANGE UP (ref 8.4–10.5)
CHLORIDE SERPL-SCNC: 105 MMOL/L — SIGNIFICANT CHANGE UP (ref 96–108)
CO2 SERPL-SCNC: 21 MMOL/L — LOW (ref 22–31)
CREAT SERPL-MCNC: 0.79 MG/DL — SIGNIFICANT CHANGE UP (ref 0.5–1.3)
EOSINOPHIL # BLD AUTO: 0.14 K/UL — SIGNIFICANT CHANGE UP (ref 0–0.5)
EOSINOPHIL NFR BLD AUTO: 1.6 % — SIGNIFICANT CHANGE UP (ref 0–6)
GLUCOSE SERPL-MCNC: 119 MG/DL — HIGH (ref 70–99)
HCT VFR BLD CALC: 45.6 % — SIGNIFICANT CHANGE UP (ref 39–50)
HGB BLD-MCNC: 15.1 G/DL — SIGNIFICANT CHANGE UP (ref 13–17)
IMM GRANULOCYTES NFR BLD AUTO: 0.4 % — SIGNIFICANT CHANGE UP (ref 0–1.5)
LYMPHOCYTES # BLD AUTO: 1.71 K/UL — SIGNIFICANT CHANGE UP (ref 1–3.3)
LYMPHOCYTES # BLD AUTO: 19.1 % — SIGNIFICANT CHANGE UP (ref 13–44)
MCHC RBC-ENTMCNC: 30.2 PG — SIGNIFICANT CHANGE UP (ref 27–34)
MCHC RBC-ENTMCNC: 33.1 GM/DL — SIGNIFICANT CHANGE UP (ref 32–36)
MCV RBC AUTO: 91.2 FL — SIGNIFICANT CHANGE UP (ref 80–100)
MONOCYTES # BLD AUTO: 0.5 K/UL — SIGNIFICANT CHANGE UP (ref 0–0.9)
MONOCYTES NFR BLD AUTO: 5.6 % — SIGNIFICANT CHANGE UP (ref 2–14)
NEUTROPHILS # BLD AUTO: 6.52 K/UL — SIGNIFICANT CHANGE UP (ref 1.8–7.4)
NEUTROPHILS NFR BLD AUTO: 73.1 % — SIGNIFICANT CHANGE UP (ref 43–77)
NRBC # BLD: 0 /100 WBCS — SIGNIFICANT CHANGE UP (ref 0–0)
PLATELET # BLD AUTO: 278 K/UL — SIGNIFICANT CHANGE UP (ref 150–400)
POTASSIUM SERPL-MCNC: 3.9 MMOL/L — SIGNIFICANT CHANGE UP (ref 3.5–5.3)
POTASSIUM SERPL-SCNC: 3.9 MMOL/L — SIGNIFICANT CHANGE UP (ref 3.5–5.3)
RBC # BLD: 5 M/UL — SIGNIFICANT CHANGE UP (ref 4.2–5.8)
RBC # FLD: 12.2 % — SIGNIFICANT CHANGE UP (ref 10.3–14.5)
RH IG SCN BLD-IMP: POSITIVE — SIGNIFICANT CHANGE UP
SODIUM SERPL-SCNC: 136 MMOL/L — SIGNIFICANT CHANGE UP (ref 135–145)
WBC # BLD: 8.93 K/UL — SIGNIFICANT CHANGE UP (ref 3.8–10.5)
WBC # FLD AUTO: 8.93 K/UL — SIGNIFICANT CHANGE UP (ref 3.8–10.5)

## 2021-04-13 RX ORDER — HYDROMORPHONE HYDROCHLORIDE 2 MG/ML
0.5 INJECTION INTRAMUSCULAR; INTRAVENOUS; SUBCUTANEOUS
Refills: 0 | Status: COMPLETED | OUTPATIENT
Start: 2021-04-13 | End: 2021-04-13

## 2021-04-13 RX ORDER — ACETAMINOPHEN 500 MG
1000 TABLET ORAL ONCE
Refills: 0 | Status: COMPLETED | OUTPATIENT
Start: 2021-04-13 | End: 2021-04-13

## 2021-04-13 RX ORDER — NALOXONE HYDROCHLORIDE 4 MG/.1ML
0.1 SPRAY NASAL
Refills: 0 | Status: DISCONTINUED | OUTPATIENT
Start: 2021-04-13 | End: 2021-04-16

## 2021-04-13 RX ORDER — CHLORHEXIDINE GLUCONATE 213 G/1000ML
1 SOLUTION TOPICAL ONCE
Refills: 0 | Status: COMPLETED | OUTPATIENT
Start: 2021-04-13 | End: 2021-04-13

## 2021-04-13 RX ORDER — ACETAMINOPHEN 500 MG
1000 TABLET ORAL EVERY 8 HOURS
Refills: 0 | Status: DISCONTINUED | OUTPATIENT
Start: 2021-04-13 | End: 2021-04-13

## 2021-04-13 RX ORDER — CEFAZOLIN SODIUM 1 G
2000 VIAL (EA) INJECTION EVERY 8 HOURS
Refills: 0 | Status: COMPLETED | OUTPATIENT
Start: 2021-04-13 | End: 2021-04-14

## 2021-04-13 RX ORDER — GABAPENTIN 400 MG/1
300 CAPSULE ORAL ONCE
Refills: 0 | Status: COMPLETED | OUTPATIENT
Start: 2021-04-13 | End: 2021-04-13

## 2021-04-13 RX ORDER — METOCLOPRAMIDE HCL 10 MG
10 TABLET ORAL ONCE
Refills: 0 | Status: DISCONTINUED | OUTPATIENT
Start: 2021-04-13 | End: 2021-04-16

## 2021-04-13 RX ORDER — CYCLOBENZAPRINE HYDROCHLORIDE 10 MG/1
10 TABLET, FILM COATED ORAL EVERY 8 HOURS
Refills: 0 | Status: DISCONTINUED | OUTPATIENT
Start: 2021-04-13 | End: 2021-04-16

## 2021-04-13 RX ORDER — HYDROMORPHONE HYDROCHLORIDE 2 MG/ML
30 INJECTION INTRAMUSCULAR; INTRAVENOUS; SUBCUTANEOUS
Refills: 0 | Status: DISCONTINUED | OUTPATIENT
Start: 2021-04-13 | End: 2021-04-14

## 2021-04-13 RX ORDER — CEFAZOLIN SODIUM 1 G
2000 VIAL (EA) INJECTION EVERY 8 HOURS
Refills: 0 | Status: DISCONTINUED | OUTPATIENT
Start: 2021-04-13 | End: 2021-04-13

## 2021-04-13 RX ORDER — DEXAMETHASONE 0.5 MG/5ML
10 ELIXIR ORAL EVERY 8 HOURS
Refills: 0 | Status: COMPLETED | OUTPATIENT
Start: 2021-04-13 | End: 2021-04-14

## 2021-04-13 RX ORDER — HYDROMORPHONE HYDROCHLORIDE 2 MG/ML
0.5 INJECTION INTRAMUSCULAR; INTRAVENOUS; SUBCUTANEOUS
Refills: 0 | Status: DISCONTINUED | OUTPATIENT
Start: 2021-04-13 | End: 2021-04-14

## 2021-04-13 RX ORDER — SENNA PLUS 8.6 MG/1
2 TABLET ORAL AT BEDTIME
Refills: 0 | Status: DISCONTINUED | OUTPATIENT
Start: 2021-04-13 | End: 2021-04-16

## 2021-04-13 RX ORDER — MAGNESIUM HYDROXIDE 400 MG/1
30 TABLET, CHEWABLE ORAL EVERY 12 HOURS
Refills: 0 | Status: DISCONTINUED | OUTPATIENT
Start: 2021-04-13 | End: 2021-04-16

## 2021-04-13 RX ORDER — POLYETHYLENE GLYCOL 3350 17 G/17G
17 POWDER, FOR SOLUTION ORAL AT BEDTIME
Refills: 0 | Status: DISCONTINUED | OUTPATIENT
Start: 2021-04-13 | End: 2021-04-16

## 2021-04-13 RX ORDER — SODIUM CHLORIDE 9 MG/ML
1000 INJECTION, SOLUTION INTRAVENOUS
Refills: 0 | Status: DISCONTINUED | OUTPATIENT
Start: 2021-04-13 | End: 2021-04-14

## 2021-04-13 RX ORDER — APREPITANT 80 MG/1
40 CAPSULE ORAL ONCE
Refills: 0 | Status: COMPLETED | OUTPATIENT
Start: 2021-04-13 | End: 2021-04-13

## 2021-04-13 RX ORDER — ACETAMINOPHEN 500 MG
1000 TABLET ORAL EVERY 8 HOURS
Refills: 0 | Status: DISCONTINUED | OUTPATIENT
Start: 2021-04-13 | End: 2021-04-16

## 2021-04-13 RX ORDER — ONDANSETRON 8 MG/1
4 TABLET, FILM COATED ORAL EVERY 6 HOURS
Refills: 0 | Status: DISCONTINUED | OUTPATIENT
Start: 2021-04-13 | End: 2021-04-16

## 2021-04-13 RX ADMIN — Medication 1000 MILLIGRAM(S): at 16:24

## 2021-04-13 RX ADMIN — SENNA PLUS 2 TABLET(S): 8.6 TABLET ORAL at 22:07

## 2021-04-13 RX ADMIN — HYDROMORPHONE HYDROCHLORIDE 0.5 MILLIGRAM(S): 2 INJECTION INTRAMUSCULAR; INTRAVENOUS; SUBCUTANEOUS at 15:42

## 2021-04-13 RX ADMIN — APREPITANT 40 MILLIGRAM(S): 80 CAPSULE ORAL at 10:07

## 2021-04-13 RX ADMIN — CHLORHEXIDINE GLUCONATE 1 APPLICATION(S): 213 SOLUTION TOPICAL at 09:54

## 2021-04-13 RX ADMIN — Medication 100 MILLIGRAM(S): at 19:23

## 2021-04-13 RX ADMIN — POLYETHYLENE GLYCOL 3350 17 GRAM(S): 17 POWDER, FOR SOLUTION ORAL at 22:07

## 2021-04-13 RX ADMIN — CYCLOBENZAPRINE HYDROCHLORIDE 10 MILLIGRAM(S): 10 TABLET, FILM COATED ORAL at 22:07

## 2021-04-13 RX ADMIN — HYDROMORPHONE HYDROCHLORIDE 30 MILLILITER(S): 2 INJECTION INTRAMUSCULAR; INTRAVENOUS; SUBCUTANEOUS at 15:17

## 2021-04-13 RX ADMIN — HYDROMORPHONE HYDROCHLORIDE 0.5 MILLIGRAM(S): 2 INJECTION INTRAMUSCULAR; INTRAVENOUS; SUBCUTANEOUS at 15:00

## 2021-04-13 RX ADMIN — Medication 1000 MILLIGRAM(S): at 10:07

## 2021-04-13 RX ADMIN — Medication 1000 MILLIGRAM(S): at 23:07

## 2021-04-13 RX ADMIN — Medication 1000 MILLIGRAM(S): at 22:07

## 2021-04-13 RX ADMIN — HYDROMORPHONE HYDROCHLORIDE 0.5 MILLIGRAM(S): 2 INJECTION INTRAMUSCULAR; INTRAVENOUS; SUBCUTANEOUS at 14:45

## 2021-04-13 RX ADMIN — Medication 1 APPLICATION(S): at 09:54

## 2021-04-13 RX ADMIN — Medication 400 MILLIGRAM(S): at 15:51

## 2021-04-13 RX ADMIN — HYDROMORPHONE HYDROCHLORIDE 0.5 MILLIGRAM(S): 2 INJECTION INTRAMUSCULAR; INTRAVENOUS; SUBCUTANEOUS at 14:28

## 2021-04-13 RX ADMIN — Medication 102 MILLIGRAM(S): at 18:27

## 2021-04-13 RX ADMIN — GABAPENTIN 300 MILLIGRAM(S): 400 CAPSULE ORAL at 10:07

## 2021-04-13 NOTE — DISCHARGE NOTE PROVIDER - NSDCCPTREATMENT_GEN_ALL_CORE_FT
PRINCIPAL PROCEDURE  Procedure: Decompression with fusion of lumbar spine  Findings and Treatment: lumbar radiculopathy

## 2021-04-13 NOTE — PROGRESS NOTE ADULT - SUBJECTIVE AND OBJECTIVE BOX
Orthopedics Post Op Check    Procedure: L5-S1 decompression and fusion  Surgeon: Dr. Garcia     Pt comfortable, without complaints. Pain moderately controlled with IV pain meds in PACU  Denies CP, SOB, N/V, numbness/tingling     Vital Signs Last 24 Hrs  T(C): 36.3 (13 Apr 2021 13:50), Max: 36.3 (13 Apr 2021 13:50)  T(F): 97.3 (13 Apr 2021 13:50), Max: 97.3 (13 Apr 2021 13:50)  HR: 73 (13 Apr 2021 14:20) (68 - 75)  BP: 111/64 (13 Apr 2021 14:20) (104/60 - 119/69)  BP(mean): 82 (13 Apr 2021 14:20) (76 - 90)  RR: 26 (13 Apr 2021 14:20) (21 - 32)  SpO2: 99% (13 Apr 2021 14:20) (97% - 99%)  AVSS, NAD      General: Pt Alert and oriented, comfortable  Dressing C/D/I, HV x 1 in place  Sensation intact and equal BLE   Motor ehl/ta/gs/fhl 5/5 BLE  Pulses dp palpable BLE                           15.1   8.93  )-----------( 278      ( 13 Apr 2021 14:43 )             45.6     A/P: 63yMale POD#0 s/p L5-S1 decompression fusion doing well in PACU, requiring IV pain meds   - Stable. Monitor labs, and HV output  - Pain Control  - DVT ppx: SCDs  - Edyta-op abx: Ancef   - PT, WBS: WBAT  - F/U AM Labs

## 2021-04-13 NOTE — DISCHARGE NOTE PROVIDER - CARE PROVIDER_API CALL
Salinas Garcia (DO)  Orthopaedic Surgery  [address] [place], [place] [address]  Phone: [phone]  Fax: [phone]  Follow Up Time:

## 2021-04-13 NOTE — DISCHARGE NOTE PROVIDER - NSDCFUADDINST_GEN_ALL_CORE_FT
No strenuous activity (bending/twisting), heavy lifting, driving or returning to work until cleared by MD.  Change dressing daily with gauze/tape till post-op day #5, then leave incision open to air.  You may shower post-op day#5, keep incision clean and dry.   Try to have regular bowel movements, take stool softener or laxative if necessary.  May take pepcid or zantac for upset stomach.  May apply ice to affected areas to decrease swelling.  Call to schedule an appt with Dr. Garcia for follow up, if you have staples or sutures they will be removed in office.  Contact your doctor if you experience: fever greater than 101.5, chills, chest pain, difficulty breathing, redness or excessive drainage around the incision, other concerns.  No strenuous activity (bending/twisting), heavy lifting, exercising, driving or returning to work until cleared by MD.  Change dressing daily with gauze/tape till post-op day #5, then leave incision open to air.  You may shower post-op day#5, keep incision clean and dry.   Try to have regular bowel movements, take stool softener or laxative if necessary.  May take pepcid or zantac for upset stomach.  May apply ice to affected areas to decrease swelling.  Call to schedule an appt with Dr. Garcia for follow up, if you have staples or sutures they will be removed in office.  Contact your doctor if you experience: fever greater than 101.5, chills, chest pain, difficulty breathing, redness or excessive drainage around the incision, other concerns.   Take antibiotics entirely as prescribed.  Take steroids as instructed om the packet.

## 2021-04-13 NOTE — DISCHARGE NOTE PROVIDER - HOSPITAL COURSE
Admitted  Surgery L5-S1 LUMBAR DECOMPRESSION AND FUSION  Edyta-op Antibiotics  Pain control  DVT prophylaxis  OOB/Physical Therapy Admitted- 4-  Surgery L5-S1 LUMBAR DECOMPRESSION AND FUSION  Edyta-op Antibiotics  Pain control  DVT prophylaxis  OOB/Physical Therapy   Pain Management Consult

## 2021-04-13 NOTE — PACU DISCHARGE NOTE - COMMENTS
pt aao x3.  VSS.  lower back betadine dsg c/d/i; hemovac x1 to ss. PCA in use for pain management.  reports decreased pain.  report given to RN on 8 wollman.  pt to go to 4 via bed with transport and RN.

## 2021-04-13 NOTE — DISCHARGE NOTE PROVIDER - NSDCMRMEDTOKEN_GEN_ALL_CORE_FT
acetaminophen 500 mg oral tablet: 2 tab(s) orally every 8 hours MDD:6  cefadroxil 500 mg oral capsule: 1 cap(s) orally 2 times a day MDD:2  cyclobenzaprine 10 mg oral tablet: 1 tab(s) orally every 8 hours MDD:3  MethylPREDNISolone Dose Pack 4 mg oral tablet: take as directed on packet  Start on Day 2  oxyCODONE 5 mg oral tablet: 1 tab(s) orally every 4 hours, As needed, severe pain (7 - 10) MDD:6  senna oral tablet: 2 tab(s) orally once a day (at bedtime)   acetaminophen 500 mg oral tablet: 2 tab(s) orally every 8 hours MDD:6  cefadroxil 500 mg oral capsule: 1 cap(s) orally 2 times a day MDD:2  cyclobenzaprine 10 mg oral tablet: 1 tab(s) orally every 8 hours MDD:3  MethylPREDNISolone Dose Pack 4 mg oral tablet: take as directed on packet  Start on Day 2  oxycodone-acetaminophen 5 mg-300 mg oral tablet: 1 tab(s) orally every 4 hours, As Needed for severe pain MDD:6   senna oral tablet: 2 tab(s) orally once a day (at bedtime)   acetaminophen 500 mg oral tablet: 2 tab(s) orally every 8 hours MDD:6  cefadroxil 500 mg oral capsule: 1 cap(s) orally 2 times a day MDD:2  cyclobenzaprine 10 mg oral tablet: 1 tab(s) orally every 8 hours MDD:3  MethylPREDNISolone Dose Pack 4 mg oral tablet: take as directed on packet  Start on Day 2  Percocet 5 mg-325 mg oral tablet: 1 tab(s) orally every 6 hours MDD:4  senna oral tablet: 2 tab(s) orally once a day (at bedtime)

## 2021-04-13 NOTE — CONSULT NOTE ADULT - SUBJECTIVE AND OBJECTIVE BOX
Pain Management Consult Note - [facility] [phone]    Chief Complaint: Lower back pain     HPI: This is a 64 y/o male with complaints of lower back pain scheduled for a Lami/PSF L5-S1 today. Patient report pain started1 year and has since progressed after a work related injury. Patient describes the pain as sharp with associated stiffness, with pain radiating down the left side. At home patient takes Oxycodone 5-325 mg PO      Pain is ___ sharp ____dull ___burning ___achy ___ Intensity: ____ mild ___mod ___severe     Location ____surgical site ____cervical _____lumbar ____abd ____upper ext____lower ext    Worse with ____activity ____movement _____physical therapy___ Rest    Improved with ____medication ____rest ____physical therapy    ROS: Const:  ___febrile   Eyes:___ENT:___CV: ___chest pain  Resp: ____sob  GI:___nausea ___vomiting ___abd pain ___npo ___clears __full diet __bm  :___ Musk: ___pain ___spasm  Skin:___ Neuro:  ___sedation___confusion___ numbness ___weakness ___paresth  Psych:__anxiety  Endo:___ Heme:___Allergy:_________, ___all others reviewed and negative    PAST MEDICAL & SURGICAL HISTORY:  HLP (hyperkeratosis lenticularis perstans)    HTN (hypertension)    Bilateral inguinal hernia    History of tonsillectomy    H/O right wrist surgery    History of eye surgery  bilat        SH: ___Tobacco   ___Alcohol                          FH:FAMILY HISTORY:      influenza   Vaccine 0.5 milliLiter(s) IntraMuscular once      T(C): --  HR: --  BP: --  RR: --  SpO2: --  Wt(kg): --    T(C): --  HR: --  BP: --  RR: --  SpO2: --  Wt(kg): --    T(C): --  HR: --  BP: --  RR: --  SpO2: --  Wt(kg): --    PHYSICAL EXAM:  Gen Appearance: ___no acute distress ___appropriate        Neuro: ___SILT feet____ EOM Intact Psych: AAOX__, ___mood/affect appropriate        Eyes: ___conjunctiva WNL  _____ Pupils equal and round        ENT: ___ears and nose atraumatic___ Hearing grossly intact        Neck: ___trachea midline, no visible masses ___thyroid without palpable mass    Resp: ___Nml WOB____No tactile fremitus ___clear to auscultation    Cardio: ___extremities free from edema ____pedal pulses palpable    GI/Abdomen: ___soft _____ Nontender______Nondistended_____HSM    Lymphatic: ___no palpable nodes in neck  ___no palpable nodes calves and feet    Skin/Wound: ___Incision, ___Dressing c/d/i,   ____surrounding tissues soft,  ___drain/chest tube present____    Muscular: EHL ___/5  Gastrocnemius___/5    ___absent clubbing/cyanosis      ASSESSMENT: This is a 63y old Male with a history of   M54.16    Handoff    No pertinent past medical history    HLP (hyperkeratosis lenticularis perstans)    HTN (hypertension)    Lumbar radiculopathy    Lumbar radiculopathy    HTN (hypertension)    HLP (hyperkeratosis lenticularis perstans)    Bilateral inguinal hernia    History of tonsillectomy    H/O right wrist surgery    History of eye surgery    [id number]        Recommended Treatment PLAN:                 Pain Management Consult Note - [facility] [phone]    Chief Complaint: Lower back pain     HPI: This is a 62 y/o male with a PMH of HTN with complaints of lower back pain scheduled for a Lami/PSF L5-S1 today. Patient report pain started1 year and has since progressed after a work related injury. Patient describes the pain as sharp with associated stiffness, with pain radiating down the left side. At home patient takes Oxycodone 5-325 mg PO PRN Q6rs and Flexeril 10 mg Q8hrs PRN. Patient denies any illicit drug use.     Pain is ___ sharp ____dull ___burning ___achy ___ Intensity: ____ mild ___mod ___severe     Location ____surgical site ____cervical _____lumbar ____abd ____upper ext____lower ext    Worse with ____activity ____movement _____physical therapy___ Rest    Improved with ____medication ____rest ____physical therapy    ROS: Const:  ___febrile   Eyes:___ENT:___CV: ___chest pain  Resp: ____sob  GI:___nausea ___vomiting ___abd pain ___npo ___clears __full diet __bm  :___ Musk: ___pain ___spasm  Skin:___ Neuro:  ___sedation___confusion___ numbness ___weakness ___paresth  Psych:__anxiety  Endo:___ Heme:___Allergy:_________, ___all others reviewed and negative    PAST MEDICAL & SURGICAL HISTORY:  HLP (hyperkeratosis lenticularis perstans)  HTN (hypertension)  Bilateral inguinal hernia  History of tonsillectomy  H/O right wrist surgery  History of eye surgery - bilateral    SH: ___Tobacco   ___Alcohol                          FH:FAMILY HISTORY:    influenza   Vaccine 0.5 milliLiter(s) IntraMuscular once    T(C): --  HR: --  BP: --  RR: --  SpO2: --  Wt(kg): --    T(C): --  HR: --  BP: --  RR: --  SpO2: --  Wt(kg): --    T(C): --  HR: --  BP: --  RR: --  SpO2: --  Wt(kg): --    PHYSICAL EXAM:  Gen Appearance: ___no acute distress ___appropriate        Neuro: ___SILT feet____ EOM Intact Psych: AAOX__, ___mood/affect appropriate        Eyes: ___conjunctiva WNL  _____ Pupils equal and round        ENT: ___ears and nose atraumatic___ Hearing grossly intact        Neck: ___trachea midline, no visible masses ___thyroid without palpable mass    Resp: ___Nml WOB____No tactile fremitus ___clear to auscultation    Cardio: ___extremities free from edema ____pedal pulses palpable    GI/Abdomen: ___soft _____ Nontender______Nondistended_____HSM    Lymphatic: ___no palpable nodes in neck  ___no palpable nodes calves and feet    Skin/Wound: ___Incision, ___Dressing c/d/i,   ____surrounding tissues soft,  ___drain/chest tube present____    Muscular: EHL ___/5  Gastrocnemius___/5    ___absent clubbing/cyanosis      ASSESSMENT: This is a 63y old Male with a history of HTN and lumbar radiculopathy scheduled for a Lami/PSF L5-S1.    Recommended Treatment PLAN:  1. Start Dilaudid IV PCA 0.2 mg [id number], 10mg 4 hour max    - Dilaudid 0.5 mg 1 hour clinician bolus  2. Start Dilaudid 0.5 mg IVP Q2hrs PRN breakthrough pain   3. Start Flexeril 10 mg PO Q8hrs PRN muscle spasm  4. Start Tylenol 1gram PO TID  Plan discussed with Dr. Geronimo               Pain Management Consult Note - [facility] [phone]    Chief Complaint: Lower back pain     HPI: This is a 64 y/o male with a PMH of HTN with complaints of lower back pain scheduled for a Lami/PSF L5-S1 today. Patient report pain started 1 year and has since progressed after a work related injury. Patient describes the pain as sharp with associated stiffness, with pain radiating down the left side. At home, patient reports taking Naproxen 500 mg and Flexeril 10 mg PO Q8hrs PRN. Patient denies any illicit drug use. Post-operatively, patient with complaints of pain at the surgical site.    Pain is _X__ sharp ____dull ___burning ___achy ___ Intensity: ____ mild _X__mod __X_severe     Location ____surgical site ____cervical __X___lumbar ____abd ____upper ext__X__lower ext    Worse with ___X_activity __X__movement _____physical therapy___ Rest    Improved with __X__medication _X___rest ____physical therapy    ROS: Const:  N___febrile   Eyes:___ENT:___CV: __N_chest pain  Resp: __N__sob  GI:_N__nausea __N_vomiting _N__abd pain ___npo ___clears __full diet __bm  :___ Musk: _Y__pain __N_spasm  Skin:_N__ Neuro:  __N_sedation__N_confusion__N_ numbness _N__weakness _N__paresth  Psych:_N_anxiety  Endo:_N__ Heme:___Allergy:___NKDA______, __Y_all others reviewed and negative    PAST MEDICAL & SURGICAL HISTORY:  HLP (hyperkeratosis lenticularis perstans)  HTN (hypertension)  Bilateral inguinal hernia  History of tonsillectomy  H/O right wrist surgery  History of eye surgery - bilateral    SH: _N__Tobacco   _N__Alcohol                          FH:FAMILY HISTORY:    influenza   Vaccine 0.5 milliLiter(s) IntraMuscular once    T(C): --  HR: --  BP: --  RR: --  SpO2: --  Wt(kg): --    T(C): --  HR: --  BP: --  RR: --  SpO2: --  Wt(kg): --    T(C): --  HR: --  BP: --  RR: --  SpO2: --  Wt(kg): --    PHYSICAL EXAM:  Gen Appearance: __X_no acute distress _X__appropriate        Neuro: _X__SILT feet____ EOM Intact Psych: __X_Alert & oriented, _X__mood/affect appropriate        Eyes: __X_conjunctiva WNL  _____ Pupils equal and round        ENT: __X_ears and nose atraumatic__X_ Hearing grossly intact        Neck: _X__trachea midline, no visible masses ___thyroid without palpable mass    Resp: _X__Nml WOB____No tactile fremitus ___clear to auscultation    Cardio: __X_extremities free from edema __X__pedal pulses palpable    GI/Abdomen: _X__soft _____X Nontender______Nondistended_____HSM    Lymphatic: ___no palpable nodes in neck  ___no palpable nodes calves and feet    Skin/Wound: __X_Incision, __X_Dressing c/d/i,   ____surrounding tissues soft,  ___drain/chest tube present____    Muscular: EHL __5_/5  Gastrocnemius_5__/5    _X__absent clubbing/cyanosis      ASSESSMENT: This is a 63y old Male with a history of HTN and lumbar radiculopathy scheduled for a Lami/PSF L5-S1, with moderate pain.    Recommended Treatment PLAN:  1. Start Dilaudid IV PCA 0.2 mg [id number], 10mg 4 hour max    - Dilaudid 0.5 mg 1 hour clinician bolus  2. Start Dilaudid 0.5 mg IVP Q2hrs PRN breakthrough pain   3. Start Flexeril 10 mg PO Q8hrs PRN muscle spasm  4. Start Tylenol 1gram PO TID  Plan discussed with Dr. Geronimo               Pain Management Consult Note - [facility] [phone]    Chief Complaint: Lower back pain     HPI: This is a 62 y/o male with a PMH of HTN with complaints of lower back pain scheduled for a Lami/PSF L5-S1 today. Patient report pain started 1 year and has since progressed after a work related injury. Patient describes the pain as sharp with associated stiffness, with pain radiating down the left side. At home, patient reports taking Naproxen 500 mg and Flexeril 10 mg PO Q8hrs PRN. Patient denies any illicit drug use. Post-operatively, patient with complaints of pain at the surgical site.    Pain is _X__ sharp ____dull ___burning ___achy ___ Intensity: ____ mild _X__mod __X_severe     Location ____surgical site ____cervical __X___lumbar ____abd ____upper ext__X__lower ext    Worse with ___X_activity __X__movement _____physical therapy___ Rest    Improved with __X__medication _X___rest ____physical therapy    ROS: Const:  N___febrile   Eyes:___ENT:___CV: __N_chest pain  Resp: __N__sob  GI:_N__nausea __N_vomiting _N__abd pain ___npo ___clears __full diet __bm  :___ Musk: _Y__pain __N_spasm  Skin:_N__ Neuro:  __N_sedation__N_confusion__N_ numbness _N__weakness _N__paresth  Psych:_N_anxiety  Endo:_N__ Heme:___Allergy:___NKDA______, __Y_all others reviewed and negative    PAST MEDICAL & SURGICAL HISTORY:  HLP (hyperkeratosis lenticularis perstans)  HTN (hypertension)  Bilateral inguinal hernia  History of tonsillectomy  H/O right wrist surgery  History of eye surgery - bilateral    SH: _N__Tobacco   _N__Alcohol                          FH:FAMILY HISTORY: No pertinent family history in first degree relatives    influenza   Vaccine 0.5 milliLiter(s) IntraMuscular once    T(C): --  HR: --  BP: --  RR: --  SpO2: --  Wt(kg): --    T(C): --  HR: --  BP: --  RR: --  SpO2: --  Wt(kg): --    T(C): --  HR: --  BP: --  RR: --  SpO2: --  Wt(kg): --    PHYSICAL EXAM:  Gen Appearance: __X_no acute distress _X__appropriate        Neuro: _X__SILT feet____ EOM Intact Psych: __X_Alert & oriented, _X__mood/affect appropriate        Eyes: __X_conjunctiva WNL  _____ Pupils equal and round        ENT: __X_ears and nose atraumatic__X_ Hearing grossly intact        Neck: _X__trachea midline, no visible masses ___thyroid without palpable mass    Resp: _X__Nml WOB____No tactile fremitus ___clear to auscultation    Cardio: __X_extremities free from edema __X__pedal pulses palpable    GI/Abdomen: _X__soft _____X Nontender______Nondistended_____HSM    Lymphatic: ___no palpable nodes in neck  ___no palpable nodes calves and feet    Skin/Wound: __X_Incision, __X_Dressing c/d/i,   ____surrounding tissues soft,  ___drain/chest tube present____    Muscular: EHL __5_/5  Gastrocnemius_5__/5    _X__absent clubbing/cyanosis      ASSESSMENT: This is a 63y old Male with a history of HTN and lumbar radiculopathy scheduled for a Lami/PSF L5-S1, with moderate pain.    Recommended Treatment PLAN:  1. Start Dilaudid IV PCA 0.2 mg [id number], 10mg 4 hour max    - Dilaudid 0.5 mg 1 hour clinician bolus  2. Start Dilaudid 0.5 mg IVP Q2hrs PRN breakthrough pain   3. Start Flexeril 10 mg PO Q8hrs PRN muscle spasm  4. Start Tylenol 1gram PO TID  Plan discussed with Dr. Geronimo               Pain Management Consult Note - [facility] [phone]    Chief Complaint: Lower back pain     HPI: This is a 64 y/o male with a PMH of HTN with complaints of lower back pain scheduled for a Lami/PSF L5-S1 today. Patient report pain started 1 year and has since progressed after a work related injury. Patient describes the pain as sharp with associated stiffness, with pain radiating down the left side. At home, patient reports taking Naproxen 500 mg and Flexeril 10 mg PO Q8hrs PRN. Patient denies any illicit drug use. Post-operatively, patient with complaints of pain at the surgical site.    Pain is _X__ sharp ____dull ___burning ___achy ___ Intensity: ____ mild _X__mod __X_severe     Location ____surgical site ____cervical __X___lumbar ____abd ____upper ext__X__lower ext    Worse with ___X_activity __X__movement _____physical therapy___ Rest    Improved with __X__medication _X___rest ____physical therapy    ROS: Const:  N___febrile   Eyes:___ENT:___CV: __N_chest pain  Resp: __N__sob  GI:_N__nausea __N_vomiting _N__abd pain ___npo ___clears __full diet __bm  :___ Musk: _Y__pain __N_spasm  Skin:_N__ Neuro:  __N_sedation__N_confusion__N_ numbness _N__weakness _N__paresth  Psych:_N_anxiety  Endo:_N__ Heme:___Allergy:___NKDA______, __Y_all others reviewed and negative    PAST MEDICAL & SURGICAL HISTORY:  HLP (hyperkeratosis lenticularis perstans)  HTN (hypertension)  Bilateral inguinal hernia  History of tonsillectomy  H/O right wrist surgery  History of eye surgery - bilateral    SH: _N__Tobacco   _N__Alcohol                          FH:FAMILY HISTORY: No pertinent family history in first degree relatives    influenza   Vaccine 0.5 milliLiter(s) IntraMuscular once    T(C): --  HR: --  BP: --  RR: --  SpO2: --  Wt(kg): --    T(C): --  HR: --  BP: --  RR: --  SpO2: --  Wt(kg): --    T(C): --  HR: --  BP: --  RR: --  SpO2: --  Wt(kg): --    PHYSICAL EXAM:  Gen Appearance: __X_no acute distress _X__appropriate        Neuro: _X__SILT feet____ EOM Intact Psych: __X_Alert & oriented, _X__mood/affect appropriate        Eyes: __X_conjunctiva WNL  _____ Pupils equal and round        ENT: __X_ears and nose atraumatic__X_ Hearing grossly intact        Neck: _X__trachea midline, no visible masses ___thyroid without palpable mass    Resp: _X__Nml WOB____No tactile fremitus ___clear to auscultation    Cardio: __X_extremities free from edema __X__pedal pulses palpable    GI/Abdomen: _X__soft _____X Nontender______Nondistended_____HSM    Lymphatic: ___no palpable nodes in neck  ___no palpable nodes calves and feet    Skin/Wound: __X_Incision, __X_Dressing c/d/i,   ____surrounding tissues soft,  ___drain/chest tube present____    Muscular: EHL __5_/5  Gastrocnemius_5__/5    _X__absent clubbing/cyanosis      ASSESSMENT: This is a 63y old Male with a history of HTN and lumbar radiculopathy scheduled for a Lami/PSF L5-S1, with moderate pain.    Recommended Treatment PLAN:  1. Start Dilaudid IV PCA 0.2 mg [id number], 10mg 4 hour max    - Dilaudid 0.5 mg 1 hour clinician bolus  2. Start Dilaudid 0.5 mg IVP Q2hrs PRN breakthrough pain   3. Start Flexeril 10 mg PO Q8hrs PRN muscle spasm  4. Start Tylenol 1gram PO TID  Plan discussed with Dr. Geronimo, patient seen and examined by Dr. Geronimo at PM rounds               Pain Management Consult Note - [facility] [phone]    Chief Complaint: Lower back pain     HPI: This is a 62 y/o male with a PMH of HTN with complaints of lower back pain scheduled for a Lami/PSF L5-S1 today. Patient report pain started 1 year and has since progressed after a work related injury. Patient describes the pain as sharp with associated stiffness, with pain radiating down the left side. At home, patient reports taking Naproxen 500 mg and Flexeril 10 mg PO Q8hrs PRN. Patient denies any illicit drug use. Post-operatively, patient with complaints of pain at the surgical site.    Pain is _X__ sharp ____dull ___burning ___achy ___ Intensity: ____ mild _X__mod __X_severe     Location ____surgical site ____cervical __X___lumbar ____abd ____upper ext__X__lower ext    Worse with ___X_activity __X__movement _____physical therapy___ Rest    Improved with __X__medication _X___rest ____physical therapy    ROS: Const:  N___febrile   Eyes:___ENT:___CV: __N_chest pain  Resp: __N__sob  GI:_N__nausea __N_vomiting _N__abd pain ___npo ___clears __full diet __bm  :___ Musk: _Y__pain __N_spasm  Skin:_N__ Neuro:  __N_sedation__N_confusion__N_ numbness _N__weakness _N__paresth  Psych:_N_anxiety  Endo:_N__ Heme:___Allergy:___NKDA______, __Y_all others reviewed and negative    PAST MEDICAL & SURGICAL HISTORY:  HLP (hyperkeratosis lenticularis perstans)  HTN (hypertension)  Bilateral inguinal hernia  History of tonsillectomy  H/O right wrist surgery  History of eye surgery - bilateral    SH: _N__Tobacco   _N__Alcohol                          FH:FAMILY HISTORY: No pertinent family history in first degree relatives    influenza   Vaccine 0.5 milliLiter(s) IntraMuscular once    T(C): --  HR: --  BP: --  RR: --  SpO2: --  Wt(kg): --    T(C): --  HR: --  BP: --  RR: --  SpO2: --  Wt(kg): --    T(C): --  HR: --  BP: --  RR: --  SpO2: --  Wt(kg): --    PHYSICAL EXAM:  Gen Appearance: __X_no acute distress _X__appropriate        Neuro: _X__SILT feet____ EOM Intact Psych: __X_Alert & oriented, _X__mood/affect appropriate        Eyes: __X_conjunctiva WNL  _____ Pupils equal and round        ENT: __X_ears and nose atraumatic__X_ Hearing grossly intact        Neck: _X__trachea midline, no visible masses ___thyroid without palpable mass    Resp: _X__Nml WOB____No tactile fremitus ___clear to auscultation    Cardio: __X_extremities free from edema __X__pedal pulses palpable    GI/Abdomen: _X__soft _____X Nontender______Nondistended_____HSM    Lymphatic: ___no palpable nodes in neck  ___no palpable nodes calves and feet    Skin/Wound: __X_Incision, __X_Dressing c/d/i,   ____surrounding tissues soft,  ___drain/chest tube present____    Muscular: EHL __5_/5  Gastrocnemius_5__/5    _X__absent clubbing/cyanosis      ASSESSMENT: This is a 63y old Male with a history of HTN and lumbar radiculopathy scheduled for a Lami/PSF L5-S1, with moderate pain.    Recommended Treatment PLAN:  1. Start Dilaudid IV PCA 0.2 mg [id number], 10mg 4 hour max    - Dilaudid 0.5 mg 1 hour clinician bolus  2. Start Dilaudid 0.5 mg IVP Q2hrs PRN breakthrough pain   3. Start Flexeril 10 mg PO TID  4. Start Tylenol 1gram PO TID  Plan discussed with Dr. Geronimo, patient seen and examined by Dr. Geronimo at PM rounds

## 2021-04-13 NOTE — DISCHARGE NOTE PROVIDER - NSDCCPCAREPLAN_GEN_ALL_CORE_FT
PRINCIPAL DISCHARGE DIAGNOSIS  Diagnosis: Lumbar radiculopathy  Assessment and Plan of Treatment: Lumbar radiculopathy       PRINCIPAL DISCHARGE DIAGNOSIS  Diagnosis: Lumbar radiculopathy  Assessment and Plan of Treatment: s/p L5-S1 PSF Lami

## 2021-04-14 LAB
ANION GAP SERPL CALC-SCNC: 9 MMOL/L — SIGNIFICANT CHANGE UP (ref 5–17)
BASOPHILS # BLD AUTO: 0.01 K/UL — SIGNIFICANT CHANGE UP (ref 0–0.2)
BASOPHILS NFR BLD AUTO: 0.1 % — SIGNIFICANT CHANGE UP (ref 0–2)
BUN SERPL-MCNC: 14 MG/DL — SIGNIFICANT CHANGE UP (ref 7–23)
CALCIUM SERPL-MCNC: 8.9 MG/DL — SIGNIFICANT CHANGE UP (ref 8.4–10.5)
CHLORIDE SERPL-SCNC: 103 MMOL/L — SIGNIFICANT CHANGE UP (ref 96–108)
CO2 SERPL-SCNC: 24 MMOL/L — SIGNIFICANT CHANGE UP (ref 22–31)
COVID-19 SPIKE DOMAIN AB INTERP: POSITIVE
COVID-19 SPIKE DOMAIN ANTIBODY RESULT: >250 U/ML — HIGH
CREAT SERPL-MCNC: 0.75 MG/DL — SIGNIFICANT CHANGE UP (ref 0.5–1.3)
EOSINOPHIL # BLD AUTO: 0 K/UL — SIGNIFICANT CHANGE UP (ref 0–0.5)
EOSINOPHIL NFR BLD AUTO: 0 % — SIGNIFICANT CHANGE UP (ref 0–6)
GLUCOSE SERPL-MCNC: 146 MG/DL — HIGH (ref 70–99)
HCT VFR BLD CALC: 41.1 % — SIGNIFICANT CHANGE UP (ref 39–50)
HCV AB S/CO SERPL IA: 0.3 S/CO — SIGNIFICANT CHANGE UP
HCV AB SERPL-IMP: SIGNIFICANT CHANGE UP
HGB BLD-MCNC: 13.7 G/DL — SIGNIFICANT CHANGE UP (ref 13–17)
IMM GRANULOCYTES NFR BLD AUTO: 0.7 % — SIGNIFICANT CHANGE UP (ref 0–1.5)
LYMPHOCYTES # BLD AUTO: 1.35 K/UL — SIGNIFICANT CHANGE UP (ref 1–3.3)
LYMPHOCYTES # BLD AUTO: 13.3 % — SIGNIFICANT CHANGE UP (ref 13–44)
MCHC RBC-ENTMCNC: 30.2 PG — SIGNIFICANT CHANGE UP (ref 27–34)
MCHC RBC-ENTMCNC: 33.3 GM/DL — SIGNIFICANT CHANGE UP (ref 32–36)
MCV RBC AUTO: 90.7 FL — SIGNIFICANT CHANGE UP (ref 80–100)
MONOCYTES # BLD AUTO: 0.21 K/UL — SIGNIFICANT CHANGE UP (ref 0–0.9)
MONOCYTES NFR BLD AUTO: 2.1 % — SIGNIFICANT CHANGE UP (ref 2–14)
NEUTROPHILS # BLD AUTO: 8.54 K/UL — HIGH (ref 1.8–7.4)
NEUTROPHILS NFR BLD AUTO: 83.8 % — HIGH (ref 43–77)
NRBC # BLD: 0 /100 WBCS — SIGNIFICANT CHANGE UP (ref 0–0)
PLATELET # BLD AUTO: 301 K/UL — SIGNIFICANT CHANGE UP (ref 150–400)
POTASSIUM SERPL-MCNC: 4 MMOL/L — SIGNIFICANT CHANGE UP (ref 3.5–5.3)
POTASSIUM SERPL-SCNC: 4 MMOL/L — SIGNIFICANT CHANGE UP (ref 3.5–5.3)
RBC # BLD: 4.53 M/UL — SIGNIFICANT CHANGE UP (ref 4.2–5.8)
RBC # FLD: 12.4 % — SIGNIFICANT CHANGE UP (ref 10.3–14.5)
SARS-COV-2 IGG+IGM SERPL QL IA: >250 U/ML — HIGH
SARS-COV-2 IGG+IGM SERPL QL IA: POSITIVE
SODIUM SERPL-SCNC: 136 MMOL/L — SIGNIFICANT CHANGE UP (ref 135–145)
WBC # BLD: 10.18 K/UL — SIGNIFICANT CHANGE UP (ref 3.8–10.5)
WBC # FLD AUTO: 10.18 K/UL — SIGNIFICANT CHANGE UP (ref 3.8–10.5)

## 2021-04-14 RX ORDER — OXYCODONE HYDROCHLORIDE 5 MG/1
10 TABLET ORAL EVERY 4 HOURS
Refills: 0 | Status: DISCONTINUED | OUTPATIENT
Start: 2021-04-14 | End: 2021-04-16

## 2021-04-14 RX ORDER — OXYCODONE HYDROCHLORIDE 5 MG/1
5 TABLET ORAL EVERY 4 HOURS
Refills: 0 | Status: DISCONTINUED | OUTPATIENT
Start: 2021-04-14 | End: 2021-04-16

## 2021-04-14 RX ADMIN — CYCLOBENZAPRINE HYDROCHLORIDE 10 MILLIGRAM(S): 10 TABLET, FILM COATED ORAL at 05:26

## 2021-04-14 RX ADMIN — Medication 1000 MILLIGRAM(S): at 20:47

## 2021-04-14 RX ADMIN — Medication 102 MILLIGRAM(S): at 10:01

## 2021-04-14 RX ADMIN — POLYETHYLENE GLYCOL 3350 17 GRAM(S): 17 POWDER, FOR SOLUTION ORAL at 20:47

## 2021-04-14 RX ADMIN — Medication 100 MILLIGRAM(S): at 03:50

## 2021-04-14 RX ADMIN — SENNA PLUS 2 TABLET(S): 8.6 TABLET ORAL at 20:47

## 2021-04-14 RX ADMIN — OXYCODONE HYDROCHLORIDE 10 MILLIGRAM(S): 5 TABLET ORAL at 19:09

## 2021-04-14 RX ADMIN — Medication 1000 MILLIGRAM(S): at 06:26

## 2021-04-14 RX ADMIN — HYDROMORPHONE HYDROCHLORIDE 0.5 MILLIGRAM(S): 2 INJECTION INTRAMUSCULAR; INTRAVENOUS; SUBCUTANEOUS at 13:45

## 2021-04-14 RX ADMIN — OXYCODONE HYDROCHLORIDE 10 MILLIGRAM(S): 5 TABLET ORAL at 20:00

## 2021-04-14 RX ADMIN — Medication 1000 MILLIGRAM(S): at 05:26

## 2021-04-14 RX ADMIN — Medication 1000 MILLIGRAM(S): at 14:41

## 2021-04-14 RX ADMIN — Medication 1000 MILLIGRAM(S): at 13:31

## 2021-04-14 RX ADMIN — Medication 102 MILLIGRAM(S): at 03:49

## 2021-04-14 RX ADMIN — HYDROMORPHONE HYDROCHLORIDE 0.5 MILLIGRAM(S): 2 INJECTION INTRAMUSCULAR; INTRAVENOUS; SUBCUTANEOUS at 13:31

## 2021-04-14 RX ADMIN — OXYCODONE HYDROCHLORIDE 10 MILLIGRAM(S): 5 TABLET ORAL at 13:22

## 2021-04-14 RX ADMIN — CYCLOBENZAPRINE HYDROCHLORIDE 10 MILLIGRAM(S): 10 TABLET, FILM COATED ORAL at 17:11

## 2021-04-14 RX ADMIN — OXYCODONE HYDROCHLORIDE 10 MILLIGRAM(S): 5 TABLET ORAL at 22:56

## 2021-04-14 RX ADMIN — Medication 1000 MILLIGRAM(S): at 21:47

## 2021-04-14 RX ADMIN — OXYCODONE HYDROCHLORIDE 10 MILLIGRAM(S): 5 TABLET ORAL at 12:22

## 2021-04-14 RX ADMIN — CYCLOBENZAPRINE HYDROCHLORIDE 10 MILLIGRAM(S): 10 TABLET, FILM COATED ORAL at 20:47

## 2021-04-14 NOTE — PHYSICAL THERAPY INITIAL EVALUATION ADULT - GENERAL OBSERVATIONS, REHAB EVAL
Pt received supine, +lumbar incision bandage C/D/I, +hemovac, +heplock, +bilateral SCDs, NAD, agreeable to PT.

## 2021-04-14 NOTE — PROGRESS NOTE ADULT - SUBJECTIVE AND OBJECTIVE BOX
Pain Management Progress Note - [facility] [phone]    HPI: Patient seen and examined today. Patient POD #1 s/p Lami/PSF L5-S1. Patient reports tolerable pain, rating it a 3-4/10 with rest. Patient reports pain increases to a 7-8/10 with movement. Patient reports improvement of pain with Dilaudid IV PCA. Patient reports no side effects from pain regimen. Discussed with patient about discontinuing PCA with transition to oral pain medications. Patient verbalized understanding.   reference #[id number].    Pertinent PMH: Pain at: _X__Back ___Neck___Knee ___Hip ___Shoulder ___ Opioid tolerance    Pain is __X sharp ____dull ___burning ___achy ___ Intensity: ____ mild ___X_mod _X___severe (with movement)    Location __X___surgical site _____cervical ___X__lumbar ____abd _____upper ext____lower ext    Worse with __X__activity __X__movement _____physical therapy___ Rest    Improved with __X__medication __X__rest ____physical therapy    Medications:  acetaminophen   Tablet ..  acetaminophen  IVPB ..  ceFAZolin   IVPB  cyclobenzaprine  HYDROmorphone  Injectable  ondansetron Injectable  naloxone Injectable  HYDROmorphone PCA (1 mG/mL) Rescue Clinician Bolus  HYDROmorphone PCA (1 mG/mL)  polyethylene glycol 3350  cyclobenzaprine  senna  magnesium hydroxide Suspension  metoclopramide Injectable  aluminum hydroxide/magnesium hydroxide/simethicone Suspension  cyclobenzaprine  dexAMETHasone  IVPB  lactated ringers.      ROS: Const:  _N__febrile   Eyes:_N__ENT:___CV: _N__chest pain  Resp: _N___sob  GI:_N__nausea __N_vomiting __N__abd pain ___npo ___clears __Y_full diet _N_bm  :___ Musk: _Y__pain __N_spasm  Skin:_N__ Neuro:  __N_sedation__N_confusion__N__ numbness __N_weakness __N_paresthesia  Psych:___anxiety  Endo:___ Heme:___Allergy:_NKDA__    04-14 @ 08:3398 mL/min/1.73M2    04-13 @ 14:4396 mL/min/1.73M2    Hemoglobin: 13.7 g/dL (04-14 @ 08:33)  Hemoglobin: 15.1 g/dL (04-13 @ 14:43)    T(C): 36.9 (04-14-21 @ 08:31), Max: 36.9 (04-13-21 @ 17:03)  HR: 89 (04-14-21 @ 08:31) (68 - 101)  BP: 112/63 (04-14-21 @ 08:31) (99/59 - 142/77)  RR: 17 (04-14-21 @ 08:31) (12 - 32)  SpO2: 96% (04-14-21 @ 08:31) (95% - 99%)  Wt(kg): --     PHYSICAL EXAM:  Gen Appearance: _X__no acute distress _X__appropriate      Neuro: _X__SILT feet_X___ EOM Intact Psych: AAOX_3_, _X__mood/affect appropriate        Eyes: _X__conjunctiva WNL  ____X_ Pupils equal and round        ENT: __X_ears and nose atraumatic_X__ Hearing grossly intact        Neck: _X__trachea midline, no visible masses ___thyroid without palpable mass    Resp: __X_Nml WOB____No tactile fremitus ___clear to auscultation    Cardio: _X__extremities free from edema __X__pedal pulses palpable    GI/Abdomen: _X__soft _____ Nontender____X__Nondistended_____HSM    Lymphatic: ___no palpable nodes in neck  ___no palpable nodes calves and feet    Skin/Wound: X___Incision, __X_Dressing c/d/i,   ____surrounding tissues soft,  _X__drain/chest tube present____    Muscular: EHL _5__/5  Gastrocnemius__5_/5    _X__absent clubbing/cyanosis         ASSESSMENT:  This is a 63y old Male with a history of HTN and lumbar radiculopathy, POD #1 s/p Lami/PSF L5-S1, doing well.    Recommended Treatment PLAN:  1. D/C Dilaudid IV PCA  2. Start Oxycodone 5-10 mg PO Q4hrs PRN moderate-severe pain  3. Continue Dilaudid 0.5 mg IVP Q2hrs PRN breakthrough pain  4. Continue Flexeril 10 mg PO TID  5. Continue Tylenol 1gram PO TID.  Plan discussed with Dr. Geronimo       Pain Management Progress Note - [facility] [phone]    HPI: Patient seen and examined today. Patient POD #1 s/p Lami/PSF L5-S1. Patient reports tolerable pain, rating it a 3-4/10 with rest. Patient reports pain increases to a 7-8/10 with movement. Patient reports improvement of pain with Dilaudid IV PCA. Patient reports no side effects from pain regimen. Discussed with patient about discontinuing PCA with transition to oral pain medications. Patient verbalized understanding.   reference #[id number].    Pertinent PMH: Pain at: _X__Back ___Neck___Knee ___Hip ___Shoulder ___ Opioid tolerance    Pain is __X sharp ____dull ___burning ___achy ___ Intensity: ____ mild ___X_mod _X___severe (with movement)    Location __X___surgical site _____cervical ___X__lumbar ____abd _____upper ext____lower ext    Worse with __X__activity __X__movement _____physical therapy___ Rest    Improved with __X__medication __X__rest ____physical therapy    PMH:  HLP (hyperkeratosis lenticularis perstans)  HTN (hypertension)  Bilateral inguinal hernia  History of tonsillectomy  H/O right wrist surgery  History of eye surgery - bilateral    Medications:  acetaminophen   Tablet ..  acetaminophen  IVPB ..  ceFAZolin   IVPB  cyclobenzaprine  HYDROmorphone  Injectable  ondansetron Injectable  naloxone Injectable  HYDROmorphone PCA (1 mG/mL) Rescue Clinician Bolus  HYDROmorphone PCA (1 mG/mL)  polyethylene glycol 3350  cyclobenzaprine  senna  magnesium hydroxide Suspension  metoclopramide Injectable  aluminum hydroxide/magnesium hydroxide/simethicone Suspension  cyclobenzaprine  dexAMETHasone  IVPB  lactated ringers.      ROS: Const:  _N__febrile   Eyes:_N__ENT:___CV: _N__chest pain  Resp: _N___sob  GI:_N__nausea __N_vomiting __N__abd pain ___npo ___clears __Y_full diet _N_bm  :___ Musk: _Y__pain __N_spasm  Skin:_N__ Neuro:  __N_sedation__N_confusion__N__ numbness __N_weakness __N_paresthesia  Psych:___anxiety  Endo:___ Heme:___Allergy:_NKDA__    04-14 @ 08:3398 mL/min/1.73M2    04-13 @ 14:4396 mL/min/1.73M2    Hemoglobin: 13.7 g/dL (04-14 @ 08:33)  Hemoglobin: 15.1 g/dL (04-13 @ 14:43)    T(C): 36.9 (04-14-21 @ 08:31), Max: 36.9 (04-13-21 @ 17:03)  HR: 89 (04-14-21 @ 08:31) (68 - 101)  BP: 112/63 (04-14-21 @ 08:31) (99/59 - 142/77)  RR: 17 (04-14-21 @ 08:31) (12 - 32)  SpO2: 96% (04-14-21 @ 08:31) (95% - 99%)  Wt(kg): --     PHYSICAL EXAM:  Gen Appearance: _X__no acute distress _X__appropriate      Neuro: _X__SILT feet_X___ EOM Intact Psych: AAOX_3_, _X__mood/affect appropriate        Eyes: _X__conjunctiva WNL  ____X_ Pupils equal and round        ENT: __X_ears and nose atraumatic_X__ Hearing grossly intact        Neck: _X__trachea midline, no visible masses ___thyroid without palpable mass    Resp: __X_Nml WOB____No tactile fremitus ___clear to auscultation    Cardio: _X__extremities free from edema __X__pedal pulses palpable    GI/Abdomen: _X__soft _____ Nontender____X__Nondistended_____HSM    Lymphatic: ___no palpable nodes in neck  ___no palpable nodes calves and feet    Skin/Wound: X___Incision, __X_Dressing c/d/i,   ____surrounding tissues soft,  _X__drain/chest tube present____    Muscular: EHL _5__/5  Gastrocnemius__5_/5    _X__absent clubbing/cyanosis         ASSESSMENT:  This is a 63y old Male with a history of HTN and lumbar radiculopathy, POD #1 s/p Lami/PSF L5-S1, doing well.    Recommended Treatment PLAN:  1. D/C Dilaudid IV PCA  2. Start Oxycodone 5-10 mg PO Q4hrs PRN moderate-severe pain  3. Continue Dilaudid 0.5 mg IVP Q2hrs PRN breakthrough pain  4. Continue Flexeril 10 mg PO TID  5. Continue Tylenol 1gram PO TID.  Plan discussed with Dr. Geronimo

## 2021-04-14 NOTE — PROGRESS NOTE ADULT - SUBJECTIVE AND OBJECTIVE BOX
pt seen and examined nad avss    pe dressing cdi  nvi  power5/5  sensation grossly intact  no calf tenderness    imp sp psf     plan  ambulation  pt  pain control  scd  xrays

## 2021-04-14 NOTE — PHYSICAL THERAPY INITIAL EVALUATION ADULT - BALANCE DISTURBANCE, IDENTIFIED IMPAIRMENT CONTRIBUTE, REHAB EVAL
S/p CABG, PT/OT.  Reviewed folder and post CABG information including activity restrictions, inc care, plan for cardiac rehab phase 2.  Patient is receptive but had pain 8/10 \"back\" because of \"back brace\".  Checked patient who is wearing cardiac bra.  Discussed the importance of cardiac bra for support and for inc to remain intact.  Verbalized understanding.  Discussed case with Monica SETHI, recommend pt up to chair for all meals and increase ambulation, which she stated was her goal for pt today.  Per Daine SETHI care manager, pt is going to rehab.  Education is completed from cardiac rehab standpoint.  Instructions for making appointment for cardiac rehab discussed with pt and in AVS.  Sign off today.    pain/decreased ROM/decreased strength

## 2021-04-14 NOTE — PHYSICAL THERAPY INITIAL EVALUATION ADULT - PERTINENT HX OF CURRENT PROBLEM, REHAB EVAL
63M c/o low back pain x 1 year since work related injury. Patient notes low back pain has worsened since onset. Notes stiffness, pain radiating into his left side. Patient denies use of a cane/walker for assistance, however notes ability to walk more than short distances is limited 2/2 pain.

## 2021-04-14 NOTE — PHYSICAL THERAPY INITIAL EVALUATION ADULT - ADDITIONAL COMMENTS
Pt lives with his wife and son in an apt with 2 JEMMA. At baseline, ambulates independently with no DME. Owns a SC.

## 2021-04-14 NOTE — PROGRESS NOTE ADULT - SUBJECTIVE AND OBJECTIVE BOX
Ortho Note    Pt comfortable without complaints, pain controlled  Denies CP, SOB, N/V, numbness/tingling     Vital Signs Last 24 Hrs  T(C): 36.9 (04-14-21 @ 08:31), Max: 36.9 (04-14-21 @ 08:31)  T(F): 98.4 (04-14-21 @ 08:31), Max: 98.4 (04-14-21 @ 08:31)  HR: 89 (04-14-21 @ 08:31) (89 - 89)  BP: 112/63 (04-14-21 @ 08:31) (112/63 - 112/63)  BP(mean): --  RR: 17 (04-14-21 @ 08:31) (17 - 17)  SpO2: 96% (04-14-21 @ 08:31) (96% - 96%)  I&O's Summary    13 Apr 2021 07:01  -  14 Apr 2021 07:00  --------------------------------------------------------  IN: 420 mL / OUT: 1350 mL / NET: -930 mL    14 Apr 2021 07:01  -  14 Apr 2021 13:31  --------------------------------------------------------  IN: 480 mL / OUT: 0 mL / NET: 480 mL        General: Pt Alert and oriented, NAD  DSG C/D/I back, drain x 1  Pulses intact b/l LE  Sensation intact b/l LE  Motor: EHL/FHL/TA/GS 5/5 b/l                          13.7   10.18 )-----------( 301      ( 14 Apr 2021 08:33 )             41.1     04-14    136  |  103  |  14  ----------------------------<  146<H>  4.0   |  24  |  0.75    Ca    8.9      14 Apr 2021 08:33        A/P: 63yMale POD#1 s/p PSF L5-S1  - Stable  - Pain Control  - DVT ppx: scds  - PT, WBS: wbat    Ortho Pager [phone]

## 2021-04-15 DIAGNOSIS — I10 ESSENTIAL (PRIMARY) HYPERTENSION: ICD-10-CM

## 2021-04-15 LAB
ANION GAP SERPL CALC-SCNC: 10 MMOL/L — SIGNIFICANT CHANGE UP (ref 5–17)
BASOPHILS # BLD AUTO: 0.01 K/UL — SIGNIFICANT CHANGE UP (ref 0–0.2)
BASOPHILS NFR BLD AUTO: 0.1 % — SIGNIFICANT CHANGE UP (ref 0–2)
BUN SERPL-MCNC: 21 MG/DL — SIGNIFICANT CHANGE UP (ref 7–23)
CALCIUM SERPL-MCNC: 8.5 MG/DL — SIGNIFICANT CHANGE UP (ref 8.4–10.5)
CHLORIDE SERPL-SCNC: 104 MMOL/L — SIGNIFICANT CHANGE UP (ref 96–108)
CO2 SERPL-SCNC: 25 MMOL/L — SIGNIFICANT CHANGE UP (ref 22–31)
CREAT SERPL-MCNC: 0.77 MG/DL — SIGNIFICANT CHANGE UP (ref 0.5–1.3)
EOSINOPHIL # BLD AUTO: 0 K/UL — SIGNIFICANT CHANGE UP (ref 0–0.5)
EOSINOPHIL NFR BLD AUTO: 0 % — SIGNIFICANT CHANGE UP (ref 0–6)
GLUCOSE SERPL-MCNC: 122 MG/DL — HIGH (ref 70–99)
HCT VFR BLD CALC: 38.6 % — LOW (ref 39–50)
HGB BLD-MCNC: 13 G/DL — SIGNIFICANT CHANGE UP (ref 13–17)
IMM GRANULOCYTES NFR BLD AUTO: 0.6 % — SIGNIFICANT CHANGE UP (ref 0–1.5)
LYMPHOCYTES # BLD AUTO: 1.93 K/UL — SIGNIFICANT CHANGE UP (ref 1–3.3)
LYMPHOCYTES # BLD AUTO: 13.6 % — SIGNIFICANT CHANGE UP (ref 13–44)
MCHC RBC-ENTMCNC: 31 PG — SIGNIFICANT CHANGE UP (ref 27–34)
MCHC RBC-ENTMCNC: 33.7 GM/DL — SIGNIFICANT CHANGE UP (ref 32–36)
MCV RBC AUTO: 92.1 FL — SIGNIFICANT CHANGE UP (ref 80–100)
MONOCYTES # BLD AUTO: 1.09 K/UL — HIGH (ref 0–0.9)
MONOCYTES NFR BLD AUTO: 7.7 % — SIGNIFICANT CHANGE UP (ref 2–14)
NEUTROPHILS # BLD AUTO: 11.06 K/UL — HIGH (ref 1.8–7.4)
NEUTROPHILS NFR BLD AUTO: 78 % — HIGH (ref 43–77)
NRBC # BLD: 0 /100 WBCS — SIGNIFICANT CHANGE UP (ref 0–0)
PLATELET # BLD AUTO: 286 K/UL — SIGNIFICANT CHANGE UP (ref 150–400)
POTASSIUM SERPL-MCNC: 4.2 MMOL/L — SIGNIFICANT CHANGE UP (ref 3.5–5.3)
POTASSIUM SERPL-SCNC: 4.2 MMOL/L — SIGNIFICANT CHANGE UP (ref 3.5–5.3)
RBC # BLD: 4.19 M/UL — LOW (ref 4.2–5.8)
RBC # FLD: 12.7 % — SIGNIFICANT CHANGE UP (ref 10.3–14.5)
SODIUM SERPL-SCNC: 139 MMOL/L — SIGNIFICANT CHANGE UP (ref 135–145)
WBC # BLD: 14.18 K/UL — HIGH (ref 3.8–10.5)
WBC # FLD AUTO: 14.18 K/UL — HIGH (ref 3.8–10.5)

## 2021-04-15 PROCEDURE — 72100 X-RAY EXAM L-S SPINE 2/3 VWS: CPT | Mod: 26

## 2021-04-15 RX ADMIN — Medication 1000 MILLIGRAM(S): at 05:11

## 2021-04-15 RX ADMIN — OXYCODONE HYDROCHLORIDE 5 MILLIGRAM(S): 5 TABLET ORAL at 20:48

## 2021-04-15 RX ADMIN — OXYCODONE HYDROCHLORIDE 5 MILLIGRAM(S): 5 TABLET ORAL at 21:42

## 2021-04-15 RX ADMIN — Medication 1000 MILLIGRAM(S): at 06:11

## 2021-04-15 RX ADMIN — Medication 1000 MILLIGRAM(S): at 14:20

## 2021-04-15 RX ADMIN — Medication 1000 MILLIGRAM(S): at 21:42

## 2021-04-15 RX ADMIN — Medication 1000 MILLIGRAM(S): at 13:20

## 2021-04-15 RX ADMIN — SENNA PLUS 2 TABLET(S): 8.6 TABLET ORAL at 20:47

## 2021-04-15 RX ADMIN — CYCLOBENZAPRINE HYDROCHLORIDE 10 MILLIGRAM(S): 10 TABLET, FILM COATED ORAL at 13:20

## 2021-04-15 RX ADMIN — POLYETHYLENE GLYCOL 3350 17 GRAM(S): 17 POWDER, FOR SOLUTION ORAL at 20:48

## 2021-04-15 RX ADMIN — CYCLOBENZAPRINE HYDROCHLORIDE 10 MILLIGRAM(S): 10 TABLET, FILM COATED ORAL at 20:47

## 2021-04-15 RX ADMIN — Medication 24 MILLIGRAM(S): at 13:21

## 2021-04-15 RX ADMIN — Medication 1000 MILLIGRAM(S): at 20:47

## 2021-04-15 RX ADMIN — CYCLOBENZAPRINE HYDROCHLORIDE 10 MILLIGRAM(S): 10 TABLET, FILM COATED ORAL at 05:11

## 2021-04-15 RX ADMIN — OXYCODONE HYDROCHLORIDE 10 MILLIGRAM(S): 5 TABLET ORAL at 00:00

## 2021-04-15 NOTE — DIETITIAN INITIAL EVALUATION ADULT. - PERSON TAUGHT/METHOD
encouraged adequate PO intake with emphasis on lean protein for healing post-op; pt appeared receptive/verbal instruction/patient instructed

## 2021-04-15 NOTE — DIETITIAN INITIAL EVALUATION ADULT. - OTHER CALCULATIONS
IBW used to calculate energy needs due to pt's current body weight exceeding 120% of IBW (154%). Needs adjusted for post-op, BMI.

## 2021-04-15 NOTE — PROGRESS NOTE ADULT - SUBJECTIVE AND OBJECTIVE BOX
Subjective:  Pt comfortable without complaints, pain managed with current pain medication regimen. Patient with x1 hemovac drain.   Denies CP, SOB, N/V, numbness/tingling       Vital Signs Last 24 Hrs  T(C): 36.7 (04-15-21 @ 08:10), Max: 36.7 (04-15-21 @ 04:58)  T(F): 98 (04-15-21 @ 08:10), Max: 98 (04-15-21 @ 04:58)  HR: 98 (04-15-21 @ 08:10) (82 - 98)  BP: 122/71 (04-15-21 @ 08:10) (109/67 - 122/71)  BP(mean): --  RR: 18 (04-15-21 @ 08:10) (18 - 20)  SpO2: 97% (04-15-21 @ 08:10) (95% - 97%)  AVSS      Objective:    Physical Exam:  General: Pt Alert and oriented, NAD  Lumbar DSG  C/D/I x1 hemovac  Pulses: +2 pedal pulses bilaterally, wwp toes bilaterally, cap refill less than 3 seconds  Sensation: SILT bilateral lower extremities  Motor: EHL/FHL/TA/GS- 5/5 firing COR RRR  LUNGS clear  ABD negative        A/P: 63yMale POD#2 s/p L5-S1 decompression fusion   - stable- afebrile, nontoxic apperance  - Pain Control- Oxycodone 5-10mg PO Q4h prn moderate to severe pain, Dilaudid 0.5mg Q2h IVP prn breakthrough pain , flexeril 5mg PO Q8h prn muscle spasms, tylenol 1000mg PO Q8h,   - DVT ppx: SCDs  - PT, WBS: WBAT  - Bowel regimen, IS use, PPI   - monitor drain output -   - start medrol dose pack   - Dispo: home pending pt clearance and d/c of drains    Ortho Pager [phone]

## 2021-04-15 NOTE — PROGRESS NOTE ADULT - SUBJECTIVE AND OBJECTIVE BOX
Ortho Note    Subjective:  Pt comfortable without complaints, pain managed with current pain medication regimen. Patient with x1 hemovac drain.   Denies CP, SOB, N/V, numbness/tingling       Vital Signs Last 24 Hrs  T(C): 36.7 (04-15-21 @ 08:10), Max: 36.7 (04-15-21 @ 04:58)  T(F): 98 (04-15-21 @ 08:10), Max: 98 (04-15-21 @ 04:58)  HR: 98 (04-15-21 @ 08:10) (82 - 98)  BP: 122/71 (04-15-21 @ 08:10) (109/67 - 122/71)  BP(mean): --  RR: 18 (04-15-21 @ 08:10) (18 - 20)  SpO2: 97% (04-15-21 @ 08:10) (95% - 97%)  AVSS      Objective:    Physical Exam:  General: Pt Alert and oriented, NAD  Lumbar DSG  C/D/I x1 hemovac  Pulses: +2 pedal pulses bilaterally, wwp toes bilaterally, cap refill less than 3 seconds  Sensation: SILT bilateral lower extremities  Motor: EHL/FHL/TA/GS- 5/5 firing        Plan of Care:  A/P: 63yMale POD# s/p   - Stable  - Pain Control  - DVT ppx:  - PT, WBS:     Ortho Pager [phone] Ortho Note    Subjective:  Pt comfortable without complaints, pain managed with current pain medication regimen. Patient with x1 hemovac drain.   Denies CP, SOB, N/V, numbness/tingling       Vital Signs Last 24 Hrs  T(C): 36.7 (04-15-21 @ 08:10), Max: 36.7 (04-15-21 @ 04:58)  T(F): 98 (04-15-21 @ 08:10), Max: 98 (04-15-21 @ 04:58)  HR: 98 (04-15-21 @ 08:10) (82 - 98)  BP: 122/71 (04-15-21 @ 08:10) (109/67 - 122/71)  BP(mean): --  RR: 18 (04-15-21 @ 08:10) (18 - 20)  SpO2: 97% (04-15-21 @ 08:10) (95% - 97%)  AVSS      Objective:    Physical Exam:  General: Pt Alert and oriented, NAD  Lumbar DSG  C/D/I x1 hemovac  Pulses: +2 pedal pulses bilaterally, wwp toes bilaterally, cap refill less than 3 seconds  Sensation: SILT bilateral lower extremities  Motor: EHL/FHL/TA/GS- 5/5 firing        A/P: 63yMale POD#2 s/p L5-S1 decompression fusion   - stable- afebrile, nontoxic apperance  - Pain Control- Oxycodone 5-10mg PO Q4h prn moderate to severe pain, Dilaudid 0.5mg Q2h IVP prn breakthrough pain , flexeril 5mg PO Q8h prn muscle spasms, tylenol 1000mg PO Q8h,   - DVT ppx: SCDs  - PT, WBS: WBAT  - Bowel regimen, IS use, PPI   - monitor drain output   - Dispo: home pending pt clearance and d/c of drains    Ortho Pager [phone] Ortho Note    Subjective:  Pt comfortable without complaints, pain managed with current pain medication regimen. Patient with x1 hemovac drain.   Denies CP, SOB, N/V, numbness/tingling       Vital Signs Last 24 Hrs  T(C): 36.7 (04-15-21 @ 08:10), Max: 36.7 (04-15-21 @ 04:58)  T(F): 98 (04-15-21 @ 08:10), Max: 98 (04-15-21 @ 04:58)  HR: 98 (04-15-21 @ 08:10) (82 - 98)  BP: 122/71 (04-15-21 @ 08:10) (109/67 - 122/71)  BP(mean): --  RR: 18 (04-15-21 @ 08:10) (18 - 20)  SpO2: 97% (04-15-21 @ 08:10) (95% - 97%)  AVSS      Objective:    Physical Exam:  General: Pt Alert and oriented, NAD  Lumbar DSG  C/D/I x1 hemovac  Pulses: +2 pedal pulses bilaterally, wwp toes bilaterally, cap refill less than 3 seconds  Sensation: SILT bilateral lower extremities  Motor: EHL/FHL/TA/GS- 5/5 firing        A/P: 63yMale POD#2 s/p L5-S1 decompression fusion   - stable- afebrile, nontoxic apperance  - Pain Control- Oxycodone 5-10mg PO Q4h prn moderate to severe pain, Dilaudid 0.5mg Q2h IVP prn breakthrough pain , flexeril 5mg PO Q8h prn muscle spasms, tylenol 1000mg PO Q8h,   - DVT ppx: SCDs  - PT, WBS: WBAT  - Bowel regimen, IS use, PPI   - monitor drain output -   - start medrol dose pack   - Dispo: home pending pt clearance and d/c of drains    Ortho Pager [phone]

## 2021-04-15 NOTE — DIETITIAN INITIAL EVALUATION ADULT. - PROBLEM SELECTOR PLAN 1
Admit to Orthopaedic Service.  Presents today for elective Lami/PSF L5-S1  Pt medically stable and cleared for procedure today by Dr. Peterson

## 2021-04-15 NOTE — PROGRESS NOTE ADULT - SUBJECTIVE AND OBJECTIVE BOX
Pain Management Progress Note - [facility] [phone]    HPI: Patient seen and examined today. Patient POD #2 s/p Lami/PSF L5-S1. Patient reports tolerable pain, rating it a 3-4/10 with rest. Patient reports increased pain with movement, but tolerable. Patient remains on Oxycodone 5-10 mg PO Q4hrs PRN mod-severe pain. Patient reports constipation, but no other side effects.  On 4/14/21, patient reported an episode of dizziness, headache, and blurry vision immediately after receiving a dose of Dilaudid 0.5 mg IVP. Since then, Dilaudid IVP has been discontinued and patient reports no further complaints or side effects.    reference number: [id number]    Pertinent PMH: Pain at: _X__Back ___Neck___Knee ___Hip ___Shoulder ___ Opioid tolerance    Pain is __X_ sharp ____dull ___burning ___achy ___ Intensity: ____ mild __X__mod __X__severe     Location __X___surgical site _____cervical ___X__lumbar ____abd _____upper ext____lower ext    Worse with __X__activity _X___movement _____physical therapy___ Rest    Improved with __X__medication __X__rest ____physical therapy    PMH:  HLP (hyperkeratosis lenticularis perstans)  HTN (hypertension)  Bilateral inguinal hernia  History of tonsillectomy  H/O right wrist surgery  History of eye surgery - bilateral    Medications:  acetaminophen   Tablet ..  acetaminophen  IVPB ..  ceFAZolin   IVPB  cyclobenzaprine  HYDROmorphone  Injectable  ondansetron Injectable  naloxone Injectable  HYDROmorphone PCA (1 mG/mL) Rescue Clinician Bolus  HYDROmorphone PCA (1 mG/mL)  polyethylene glycol 3350  cyclobenzaprine  senna  magnesium hydroxide Suspension  metoclopramide Injectable  aluminum hydroxide/magnesium hydroxide/simethicone Suspension  cyclobenzaprine  dexAMETHasone  IVPB  lactated ringers.    ROS: Const:  _N__febrile   Eyes:_N__ENT:___CV: _N__chest pain  Resp: _N___sob  GI:_N__nausea _N__vomiting ___N_abd pain ___npo ___clears _Y__full diet _N_bm  :_N__ Musk: _Y__pain _N__spasm  Skin:___ Neuro:  __N_sedation_N__confusion__N__ numbness __N_weakness _N__paresthesia  Psych:_N__anxiety  Endo:___ Heme:___Allergy:__NKDA_    04-15 @ 08:3497 mL/min/1.73M2    Hemoglobin: 13.0 g/dL (04-15 @ 08:34)  Hemoglobin: 13.7 g/dL (04-14 @ 08:33)  Hemoglobin: 15.1 g/dL (04-13 @ 14:43)    T(C): 36.7 (04-15-21 @ 08:10), Max: 36.7 (04-14-21 @ 20:12)  HR: 98 (04-15-21 @ 08:10) (82 - 99)  BP: 122/71 (04-15-21 @ 08:10) (109/67 - 122/71)  RR: 18 (04-15-21 @ 08:10) (17 - 20)  SpO2: 97% (04-15-21 @ 08:10) (95% - 97%)  Wt(kg): --     PHYSICAL EXAM:  Gen Appearance: __X_no acute distress __X_appropriate       Neuro: X___SILT feet____ EOM Intact Psych: AAOX_3_, __X_mood/affect appropriate        Eyes: _X__conjunctiva WNL  ___X__ Pupils equal and round        ENT: __X_ears and nose atraumatic__X_ Hearing grossly intact        Neck: _X__trachea midline, no visible masses ___thyroid without palpable mass    Resp: __X_Nml WOB____No tactile fremitus ___clear to auscultation    Cardio: _X__extremities free from edema ___X_pedal pulses palpable    GI/Abdomen: _X__soft _____ Nontender_____X_Nondistended_____HSM    Lymphatic: ___no palpable nodes in neck  ___no palpable nodes calves and feet    Skin/Wound: _X__Incision, _X__Dressing c/d/i,   ____surrounding tissues soft,  _X__drain/chest tube present____    Muscular: EHL __5_/5  Gastrocnemius_5__/5    __X_absent clubbing/cyanosis         ASSESSMENT:  This is a 63y old Male with a history of HTN and lumbar radiculopathy, POD #1 s/p Lami/PSF L5-S1, doing well.    Recommended Treatment PLAN:  1. Continue Oxcodone 5-10 mg PO Q4hrs PRN moderate-severe pain  2. Continue Flexeril 10mg PO TID  3. Continue Tylenol 1gram PO TID  Plan discussed with Dr. Geronimo, Patient seen and examined by Dr. Geronimo at PM rounds     Pain Management Progress Note - [facility] [phone]    HPI: Patient seen and examined today. Patient POD #2 s/p Lami/PSF L5-S1. Patient reports tolerable pain, rating it a 3-4/10 with rest. Patient reports increased pain with movement, but tolerable. Patient remains on Oxycodone 5-10 mg PO Q4hrs PRN mod-severe pain. Patient reports constipation, but no other side effects.  On 4/14/21, patient reported an episode of dizziness, headache, and blurry vision immediately after receiving a dose of Dilaudid 0.5 mg IVP. Since then, Dilaudid IVP has been discontinued and patient reports no further complaints or side effects.    reference number: [id number]    Pertinent PMH: Pain at: _X__Back ___Neck___Knee ___Hip ___Shoulder ___ Opioid tolerance    Pain is __X_ sharp ____dull ___burning ___achy ___ Intensity: ____ mild __X__mod __X__severe     Location __X___surgical site _____cervical ___X__lumbar ____abd _____upper ext____lower ext    Worse with __X__activity _X___movement _____physical therapy___ Rest    Improved with __X__medication __X__rest ____physical therapy    PMH:  HLP (hyperkeratosis lenticularis perstans)  HTN (hypertension)  Bilateral inguinal hernia  History of tonsillectomy  H/O right wrist surgery  History of eye surgery - bilateral    Medications:  acetaminophen   Tablet ..  acetaminophen  IVPB ..  ceFAZolin   IVPB  cyclobenzaprine  HYDROmorphone  Injectable  ondansetron Injectable  naloxone Injectable  HYDROmorphone PCA (1 mG/mL) Rescue Clinician Bolus  HYDROmorphone PCA (1 mG/mL)  polyethylene glycol 3350  cyclobenzaprine  senna  magnesium hydroxide Suspension  metoclopramide Injectable  aluminum hydroxide/magnesium hydroxide/simethicone Suspension  cyclobenzaprine  dexAMETHasone  IVPB  lactated ringers.    ROS: Const:  _N__febrile   Eyes:_N__ENT:___CV: _N__chest pain  Resp: _N___sob  GI:_N__nausea _N__vomiting ___N_abd pain ___npo ___clears _Y__full diet _N_bm  :_N__ Musk: _Y__pain _N__spasm  Skin:___ Neuro:  __N_sedation_N__confusion__N__ numbness __N_weakness _N__paresthesia  Psych:_N__anxiety  Endo:___ Heme:___Allergy:__NKDA_    04-15 @ 08:3497 mL/min/1.73M2    Hemoglobin: 13.0 g/dL (04-15 @ 08:34)  Hemoglobin: 13.7 g/dL (04-14 @ 08:33)  Hemoglobin: 15.1 g/dL (04-13 @ 14:43)    T(C): 36.7 (04-15-21 @ 08:10), Max: 36.7 (04-14-21 @ 20:12)  HR: 98 (04-15-21 @ 08:10) (82 - 99)  BP: 122/71 (04-15-21 @ 08:10) (109/67 - 122/71)  RR: 18 (04-15-21 @ 08:10) (17 - 20)  SpO2: 97% (04-15-21 @ 08:10) (95% - 97%)  Wt(kg): --     PHYSICAL EXAM:  Gen Appearance: __X_no acute distress __X_appropriate       Neuro: X___SILT feet____ EOM Intact Psych: AAOX_3_, __X_mood/affect appropriate        Eyes: _X__conjunctiva WNL  ___X__ Pupils equal and round        ENT: __X_ears and nose atraumatic__X_ Hearing grossly intact        Neck: _X__trachea midline, no visible masses ___thyroid without palpable mass    Resp: __X_Nml WOB____No tactile fremitus ___clear to auscultation    Cardio: _X__extremities free from edema ___X_pedal pulses palpable    GI/Abdomen: _X__soft _____ Nontender_____X_Nondistended_____HSM    Lymphatic: ___no palpable nodes in neck  ___no palpable nodes calves and feet    Skin/Wound: _X__Incision, _X__Dressing c/d/i,   ____surrounding tissues soft,  _X__drain/chest tube present____    Muscular: EHL __5_/5  Gastrocnemius_5__/5    __X_absent clubbing/cyanosis         ASSESSMENT:  This is a 63y old Male with a history of HTN and lumbar radiculopathy, POD #2 s/p Lami/PSF L5-S1, doing well.    Recommended Treatment PLAN:  1. Continue Oxycodone 5-10 mg PO Q4hrs PRN moderate-severe pain  2. Continue Flexeril 10mg PO TID  3. Continue Tylenol 1gram PO TID  Plan discussed with Dr. Geronimo, Patient seen and examined by Dr. Geronimo at PM rounds

## 2021-04-15 NOTE — DIETITIAN INITIAL EVALUATION ADULT. - HEIGHT FOR BMI (INCHES)
4 Rifampin Counseling: I discussed with the patient the risks of rifampin including but not limited to liver damage, kidney damage, red-orange body fluids, nausea/vomiting and severe allergy.

## 2021-04-15 NOTE — DIETITIAN INITIAL EVALUATION ADULT. - OTHER INFO
63M (see PMHx above), c/o low back pain x 1 year since work related injury. Now s/pL5-S1 decompression fusion on 4/13.     Pt seen resting in bed,  used as pt Green[place]ic speaking (ID number [id number]). Pt endorses pain when moving, denies N/V. Last BM 4/12 (endorses regular BM PTA; currently ordered for mg hydroxide, miralax and senna). Bulmaro score 20, no edema noted. Pt endorses good appetite, consuming >/=75% of meals at this time. Pt also endorses good PO intake PTA at baseline, denies food allegies, follows a regular diet. UBW is 170lb, endorses recent weight gain, now weighing in at 201lb (4/13). Please see below for full nutritional recommendations- d/w team. RD to monitor and f/u per protocol.

## 2021-04-16 ENCOUNTER — TRANSCRIPTION ENCOUNTER (OUTPATIENT)
Age: 66
End: 2021-04-16

## 2021-04-16 VITALS
HEART RATE: 80 BPM | TEMPERATURE: 98 F | RESPIRATION RATE: 17 BRPM | SYSTOLIC BLOOD PRESSURE: 135 MMHG | DIASTOLIC BLOOD PRESSURE: 75 MMHG | OXYGEN SATURATION: 97 %

## 2021-04-16 PROBLEM — Z86.79 HISTORY OF ATRIAL FIBRILLATION: Status: RESOLVED | Noted: 2021-04-16 | Resolved: 2021-04-16

## 2021-04-16 PROBLEM — Z87.438 HISTORY OF BENIGN PROSTATIC HYPERPLASIA: Status: RESOLVED | Noted: 2021-04-16 | Resolved: 2021-04-16

## 2021-04-16 PROBLEM — I66.01 OCCLUSION OF RIGHT MIDDLE CEREBRAL ARTERY: Status: RESOLVED | Noted: 2021-04-16 | Resolved: 2021-04-16

## 2021-04-16 LAB
ANION GAP SERPL CALC-SCNC: 7 MMOL/L — SIGNIFICANT CHANGE UP (ref 5–17)
BASOPHILS # BLD AUTO: 0.01 K/UL — SIGNIFICANT CHANGE UP (ref 0–0.2)
BASOPHILS NFR BLD AUTO: 0.1 % — SIGNIFICANT CHANGE UP (ref 0–2)
BUN SERPL-MCNC: 22 MG/DL — SIGNIFICANT CHANGE UP (ref 7–23)
CALCIUM SERPL-MCNC: 8.8 MG/DL — SIGNIFICANT CHANGE UP (ref 8.4–10.5)
CHLORIDE SERPL-SCNC: 104 MMOL/L — SIGNIFICANT CHANGE UP (ref 96–108)
CO2 SERPL-SCNC: 26 MMOL/L — SIGNIFICANT CHANGE UP (ref 22–31)
CREAT SERPL-MCNC: 0.84 MG/DL — SIGNIFICANT CHANGE UP (ref 0.5–1.3)
EOSINOPHIL # BLD AUTO: 0 K/UL — SIGNIFICANT CHANGE UP (ref 0–0.5)
EOSINOPHIL NFR BLD AUTO: 0 % — SIGNIFICANT CHANGE UP (ref 0–6)
GLUCOSE SERPL-MCNC: 96 MG/DL — SIGNIFICANT CHANGE UP (ref 70–99)
HCT VFR BLD CALC: 40.3 % — SIGNIFICANT CHANGE UP (ref 39–50)
HGB BLD-MCNC: 12.9 G/DL — LOW (ref 13–17)
IMM GRANULOCYTES NFR BLD AUTO: 0.9 % — SIGNIFICANT CHANGE UP (ref 0–1.5)
LYMPHOCYTES # BLD AUTO: 2.9 K/UL — SIGNIFICANT CHANGE UP (ref 1–3.3)
LYMPHOCYTES # BLD AUTO: 26 % — SIGNIFICANT CHANGE UP (ref 13–44)
MCHC RBC-ENTMCNC: 29.9 PG — SIGNIFICANT CHANGE UP (ref 27–34)
MCHC RBC-ENTMCNC: 32 GM/DL — SIGNIFICANT CHANGE UP (ref 32–36)
MCV RBC AUTO: 93.5 FL — SIGNIFICANT CHANGE UP (ref 80–100)
MONOCYTES # BLD AUTO: 0.86 K/UL — SIGNIFICANT CHANGE UP (ref 0–0.9)
MONOCYTES NFR BLD AUTO: 7.7 % — SIGNIFICANT CHANGE UP (ref 2–14)
NEUTROPHILS # BLD AUTO: 7.27 K/UL — SIGNIFICANT CHANGE UP (ref 1.8–7.4)
NEUTROPHILS NFR BLD AUTO: 65.3 % — SIGNIFICANT CHANGE UP (ref 43–77)
NRBC # BLD: 0 /100 WBCS — SIGNIFICANT CHANGE UP (ref 0–0)
PLATELET # BLD AUTO: 290 K/UL — SIGNIFICANT CHANGE UP (ref 150–400)
POTASSIUM SERPL-MCNC: 4.3 MMOL/L — SIGNIFICANT CHANGE UP (ref 3.5–5.3)
POTASSIUM SERPL-SCNC: 4.3 MMOL/L — SIGNIFICANT CHANGE UP (ref 3.5–5.3)
RBC # BLD: 4.31 M/UL — SIGNIFICANT CHANGE UP (ref 4.2–5.8)
RBC # FLD: 12.6 % — SIGNIFICANT CHANGE UP (ref 10.3–14.5)
SODIUM SERPL-SCNC: 137 MMOL/L — SIGNIFICANT CHANGE UP (ref 135–145)
WBC # BLD: 11.14 K/UL — HIGH (ref 3.8–10.5)
WBC # FLD AUTO: 11.14 K/UL — HIGH (ref 3.8–10.5)

## 2021-04-16 RX ORDER — SENNA PLUS 8.6 MG/1
2 TABLET ORAL
Qty: 0 | Refills: 0 | DISCHARGE
Start: 2021-04-16

## 2021-04-16 RX ORDER — OXYCODONE AND ACETAMINOPHEN 5; 325 MG/1; MG/1
1 TABLET ORAL
Qty: 28 | Refills: 0
Start: 2021-04-16 | End: 2021-04-22

## 2021-04-16 RX ORDER — ACETAMINOPHEN 500 MG
2 TABLET ORAL
Qty: 126 | Refills: 0
Start: 2021-04-16 | End: 2021-05-06

## 2021-04-16 RX ORDER — CYCLOBENZAPRINE HYDROCHLORIDE 10 MG/1
1 TABLET, FILM COATED ORAL
Qty: 21 | Refills: 0
Start: 2021-04-16 | End: 2021-04-22

## 2021-04-16 RX ORDER — OXYCODONE HYDROCHLORIDE 5 MG/1
1 TABLET ORAL
Qty: 30 | Refills: 0
Start: 2021-04-16 | End: 2021-04-20

## 2021-04-16 RX ADMIN — Medication 1000 MILLIGRAM(S): at 15:09

## 2021-04-16 RX ADMIN — Medication 4 MILLIGRAM(S): at 14:09

## 2021-04-16 RX ADMIN — Medication 1000 MILLIGRAM(S): at 05:20

## 2021-04-16 RX ADMIN — Medication 1000 MILLIGRAM(S): at 14:09

## 2021-04-16 RX ADMIN — Medication 4 MILLIGRAM(S): at 05:19

## 2021-04-16 RX ADMIN — CYCLOBENZAPRINE HYDROCHLORIDE 10 MILLIGRAM(S): 10 TABLET, FILM COATED ORAL at 14:09

## 2021-04-16 RX ADMIN — Medication 1000 MILLIGRAM(S): at 06:20

## 2021-04-16 RX ADMIN — CYCLOBENZAPRINE HYDROCHLORIDE 10 MILLIGRAM(S): 10 TABLET, FILM COATED ORAL at 05:20

## 2021-04-16 NOTE — PROGRESS NOTE ADULT - SUBJECTIVE AND OBJECTIVE BOX
Pain Management Progress Note - [facility] [phone]    HPI: Patient seen and examined today. Patient POD #3 s/p Lami/PSF L5-S1. Patient reports tolerable pain, rating it a 3 out of 10 with rest, increasing to a 7 out of 10 with movement or sitting on a chair. Patient remains on Oxycodone 5-10 mg PO Q4hrs PRN mod-severe pain. Patient reports having a BM yesterday and reports no other side effects from pain regimen. Pain regimen discussed with patient at bedside.    reference # [id number]    Pertinent PMH: Pain at: __X_Back ___Neck___Knee ___Hip ___Shoulder ___ Opioid tolerance    Pain is __X_ sharp ____dull ___burning ___achy ___ Intensity: ____ mild __X__mod __X__severe     Location __X___surgical site _____cervical ___X__lumbar ____abd _____upper ext____lower ext    Worse with __X__activity ___X_movement _____physical therapy___ Rest    Improved with __X__medication ___X_rest ____physical therapy    PMH:  HLP (hyperkeratosis lenticularis perstans)  HTN (hypertension)  Bilateral inguinal hernia  History of tonsillectomy  H/O right wrist surgery  History of eye surgery - bilateral    Medications:  acetaminophen   Tablet ..  acetaminophen  IVPB ..  ceFAZolin   IVPB  cyclobenzaprine  HYDROmorphone  Injectable  ondansetron Injectable  naloxone Injectable  HYDROmorphone PCA (1 mG/mL) Rescue Clinician Bolus  HYDROmorphone PCA (1 mG/mL)  polyethylene glycol 3350  cyclobenzaprine  senna  magnesium hydroxide Suspension  metoclopramide Injectable  aluminum hydroxide/magnesium hydroxide/simethicone Suspension  cyclobenzaprine  dexAMETHasone  IVPB  lactated ringers.    ROS: Const:  _N__febrile   Eyes:__N_ENT:__N_CV: _N__chest pain  Resp: __N__sob  GI:_N__nausea __N_vomiting __N__abd pain ___npo ___clears _Y__full diet Y__bm  :__N_ Musk: _Y__pain __N_spasm  Skin:_N__ Neuro:  N___sedation__N_confusion_N___ numbness __Nweakness _N__paresthesia  Psych:__N_anxiety  Endo:___ Heme:___Allergy:_NKDA__    04-16 @ 06:1894 mL/min/1.73M2    Hemoglobin: 12.9 g/dL (04-16 @ 06:18)  Hemoglobin: 13.0 g/dL (04-15 @ 08:34)    T(C): 36.4 (04-16-21 @ 08:10), Max: 37 (04-15-21 @ 15:13)  HR: 79 (04-16-21 @ 08:10) (78 - 95)  BP: 145/82 (04-16-21 @ 08:10) (121/71 - 145/82)  RR: 17 (04-16-21 @ 08:10) (17 - 20)  SpO2: 95% (04-16-21 @ 08:10) (95% - 97%)  Wt(kg): --     PHYSICAL EXAM:  Gen Appearance: _X__no acute distress __X_appropriate       Neuro: _X__SILT feet____ EOM Intact Psych: AAOX_3_, __X_mood/affect appropriate        Eyes: __X_conjunctiva WNL  _X____ Pupils equal and round        ENT: _X__ears and nose atraumatic___X Hearing grossly intact        Neck: __X_trachea midline, no visible masses ___thyroid without palpable mass    Resp: _X__Nml WOB____No tactile fremitus ___clear to auscultation    Cardio: _X__extremities free from edema _X___pedal pulses palpable    GI/Abdomen: __X_soft ___X__ Nontender____X__Nondistended_____HSM    Lymphatic: ___no palpable nodes in neck  ___no palpable nodes calves and feet    Skin/Wound: X___Incision, __X_Dressing c/d/i,   __X__surrounding tissues soft,  _X__drain/chest tube present____    Muscular: EHL __5_/5  Gastrocnemius__5_/5    __X_absent clubbing/cyanosis         ASSESSMENT:  This is a 63y old Male with a history of HTN and lumbar radiculopathy, POD #3 s/p Lami/PSF L5-S1, doing well.    Recommended Treatment PLAN:  1. Continue Oxycodone 5-10 mg PO Q4hrs PRN moderate-severe pain  2. Continue Flexeril 10mg PO TID  3. Continue Tylenol 1gram PO TID  Plan discussed with Dr. Geronimo

## 2021-04-16 NOTE — PROGRESS NOTE ADULT - NSICDXPILOT_GEN_ALL_CORE
[place]

## 2021-04-16 NOTE — DISCHARGE NOTE NURSING/CASE MANAGEMENT/SOCIAL WORK - PATIENT PORTAL LINK FT
You can access the FollowMyHealth Patient Portal offered by [facility] by registering at the following website: [email or web address]. By joining [facility]’s FollowMyHealth portal, you will also be able to view your health information using other applications (apps) compatible with our system.

## 2021-04-16 NOTE — PROGRESS NOTE ADULT - REASON FOR ADMISSION
low back pain

## 2021-04-16 NOTE — PROGRESS NOTE ADULT - SUBJECTIVE AND OBJECTIVE BOX
Ortho Note    Subjective:  Pt comfortable without complaints, pain managed with current pain medication regimen. Patient with x1 hemovac drain.   Denies CP, SOB, N/V, numbness/tingling       Vital Signs Last 24 Hrs  T(C): 36.4 (04-16-21 @ 08:10), Max: 36.4 (04-16-21 @ 08:10)  T(F): 97.5 (04-16-21 @ 08:10), Max: 97.5 (04-16-21 @ 08:10)  HR: 79 (04-16-21 @ 08:10) (79 - 79)  BP: 145/82 (04-16-21 @ 08:10) (145/82 - 145/82)  BP(mean): --  RR: 17 (04-16-21 @ 08:10) (17 - 17)  SpO2: 95% (04-16-21 @ 08:10) (95% - 95%)  AVSS    Objective:    Physical Exam:  General: Pt Alert and oriented, NAD  Lumbar DSG  C/D/I x1 hemovac  Pulses: +2 pedal pulses bilaterally, wwp toes bilaterally, cap refill less than 3 seconds  Sensation: SILT bilateral lower extremities  Motor: EHL/FHL/TA/GS- 5/5 firing        Plan of Care:  63yMale POD#3 s/p L5-S1 decompression fusion   - stable- afebrile, nontoxic apperance  - Pain Control- Oxycodone 5-10mg PO Q4h prn moderate to severe pain, Dilaudid 0.5mg Q2h IVP prn breakthrough pain , flexeril 5mg PO Q8h prn muscle spasms, tylenol 1000mg PO Q8h,   - DVT ppx: SCDs  - PT, WBS: WBAT  - Bowel regimen, IS use, PPI   - monitor drain output -   - medrol dose pack   - Dispo: home pending pt clearance and d/c of drains    Ortho Pager [phone]

## 2021-04-16 NOTE — PROGRESS NOTE ADULT - SUBJECTIVE AND OBJECTIVE BOX
pt seen and examined nad avss    pe dressing cdi  nvi  power5/5  sensation grossly intact  no calf tenderness    imp sp psf     plan  ambulation  pt  pain control  scd  dc to home after clear pt

## 2021-04-16 NOTE — PROGRESS NOTE ADULT - PROVIDER SPECIALTY LIST ADULT
Orthopedics
Pain Medicine
Pain Medicine
Orthopedics
Internal Medicine
Orthopedics
Pain Medicine

## 2021-04-18 ENCOUNTER — NON-APPOINTMENT (OUTPATIENT)
Age: 66
End: 2021-04-18

## 2021-04-20 ENCOUNTER — APPOINTMENT (OUTPATIENT)
Dept: NEUROSURGERY | Facility: CLINIC | Age: 66
End: 2021-04-20
Payer: COMMERCIAL

## 2021-04-20 VITALS
OXYGEN SATURATION: 98 % | HEIGHT: 69 IN | WEIGHT: 157 LBS | HEART RATE: 106 BPM | RESPIRATION RATE: 20 BRPM | DIASTOLIC BLOOD PRESSURE: 72 MMHG | BODY MASS INDEX: 23.25 KG/M2 | TEMPERATURE: 97.5 F | SYSTOLIC BLOOD PRESSURE: 112 MMHG

## 2021-04-20 DIAGNOSIS — M62.838 OTHER MUSCLE SPASM: ICD-10-CM

## 2021-04-20 DIAGNOSIS — L85.8 OTHER SPECIFIED EPIDERMAL THICKENING: ICD-10-CM

## 2021-04-20 DIAGNOSIS — K59.00 CONSTIPATION, UNSPECIFIED: ICD-10-CM

## 2021-04-20 DIAGNOSIS — M54.16 RADICULOPATHY, LUMBAR REGION: ICD-10-CM

## 2021-04-20 DIAGNOSIS — Z86.79 PERSONAL HISTORY OF OTHER DISEASES OF THE CIRCULATORY SYSTEM: ICD-10-CM

## 2021-04-20 DIAGNOSIS — Z87.438 PERSONAL HISTORY OF OTHER DISEASES OF MALE GENITAL ORGANS: ICD-10-CM

## 2021-04-20 DIAGNOSIS — Z79.891 LONG TERM (CURRENT) USE OF OPIATE ANALGESIC: ICD-10-CM

## 2021-04-20 DIAGNOSIS — Z78.9 OTHER SPECIFIED HEALTH STATUS: ICD-10-CM

## 2021-04-20 DIAGNOSIS — I10 ESSENTIAL (PRIMARY) HYPERTENSION: ICD-10-CM

## 2021-04-20 DIAGNOSIS — Z79.1 LONG TERM (CURRENT) USE OF NON-STEROIDAL ANTI-INFLAMMATORIES (NSAID): ICD-10-CM

## 2021-04-20 DIAGNOSIS — I66.01 OCCLUSION AND STENOSIS OF RIGHT MIDDLE CEREBRAL ARTERY: ICD-10-CM

## 2021-04-20 PROCEDURE — 99072 ADDL SUPL MATRL&STAF TM PHE: CPT

## 2021-04-20 PROCEDURE — 99214 OFFICE O/P EST MOD 30 MIN: CPT

## 2021-04-20 RX ORDER — ATORVASTATIN CALCIUM 80 MG/1
80 TABLET, FILM COATED ORAL
Refills: 0 | Status: DISCONTINUED | COMMUNITY
End: 2021-04-20

## 2021-04-20 NOTE — PHYSICAL EXAM
[General Appearance - Alert] : alert [General Appearance - In No Acute Distress] : in no acute distress [Oriented To Time, Place, And Person] : oriented to person, place, and time [Impaired Insight] : insight and judgment were intact [Affect] : the affect was normal [Mood] : the mood was normal [Cranial Nerves Optic (II)] : visual acuity intact bilaterally,  pupils equal round and reactive to light [Cranial Nerves Oculomotor (III)] : extraocular motion intact [Cranial Nerves Facial (VII)] : face symmetrical [Cranial Nerves Glossopharyngeal (IX)] : tongue and palate midline [Cranial Nerves Accessory (XI - Cranial And Spinal)] : head turning and shoulder shrug symmetric [Cranial Nerves Hypoglossal (XII)] : there was no tongue deviation with protrusion [Balance] : balance was intact [Sclera] : the sclera and conjunctiva were normal [PERRL With Normal Accommodation] : pupils were equal in size, round, reactive to light, with normal accommodation [Extraocular Movements] : extraocular movements were intact [Full Visual Field] : full visual field [Neck Appearance] : the appearance of the neck was normal [] : no respiratory distress [Respiration, Rhythm And Depth] : normal respiratory rhythm and effort [Apical Impulse] : the apical impulse was normal [Heart Rate And Rhythm] : heart rate was normal and rhythm regular [Abnormal Walk] : normal gait [FreeTextEntry5] : Mild left UE/LE weakness but remains 5/5. No pronator drift. RUE/RLL 5/5

## 2021-04-20 NOTE — HISTORY OF PRESENT ILLNESS
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 He was taken to [facility] ED on 3/29/21 where a CT did not show intracranial hemorrhage. CTA suggested occlusion of the right MCA and CTP sowed large mismatch. He was treated with IV tPA.\par \par On 3/29/21 he underwent femoral cerebral angiogram and mechanical thrombectomy with stent retriever and aspiration (TICI 3) by Dr. Pisano.\par \par LE doppler negative for DVT\par Echo  s/f dilated RA, RV, borderline dilated aortic root; no right to left shunt, normal LV size and function; no thrombus.\par AiC was 6.1, lipid profile WNL\par \par He was started on Eliquis 5 mg BID on 3/31. He is also on Metoprolol 12,5 mg BID for AF. He is also on Lipitor 80 mg .\par He was discharged home with home PT/OT on 4/1/21 with a final MRS score of 1-- no significant disability.\par \par He is doing well. He denies new onset focal weakness. He is doing PT 2x/week working on his gait and is mostly back on his normal routine. He is on Eliquis 5 mg BID.\par \par \par \par \par Handedness: RIGHT\par \par Patient Address:\par [address]\[place] [address]\par \par Stroke Neurology:\par Dr. Amanda Weller\par [address]\[place] [address]\par \par Cardiologist:\par Dr. Matthew Esparza\par [address], [place]\par [address], [place] [address]\par \par PCP:\par Dr. Joey Sandhu\par [address]\[place] [address]

## 2021-04-20 NOTE — REVIEW OF SYSTEMS
[As Noted in HPI] : as noted in HPI [As noted in HPI] : as noted in HPI [Negative] : Musculoskeletal

## 2021-04-20 NOTE — ASSESSMENT
[FreeTextEntry1] : Plan:\par 1. US carotid duplex x 1 yr and RTO to review it.\par 2. Continue Eliquis and statin. Follow up with stroke neurology, Dr. Weller.

## 2021-04-20 NOTE — REASON FOR VISIT
[Post Hospitalization] : a post hospitalization visit [Spouse] : spouse [FreeTextEntry1] : s/p femoral cerebral angiogram and mechanical thrombectomy with stent retriever and aspiration on 3/29/21.

## 2021-04-26 ENCOUNTER — TRANSCRIPTION ENCOUNTER (OUTPATIENT)
Age: 66
End: 2021-04-26

## 2021-04-26 ENCOUNTER — NON-APPOINTMENT (OUTPATIENT)
Age: 66
End: 2021-04-26

## 2021-05-03 NOTE — HISTORY OF PRESENT ILLNESS
[Home] : at home, [unfilled] , at the time of the visit. [Medical Office: ([facility])___] : at the medical office located in  [Spouse] : spouse [Verbal consent obtained from patient] : the patient, [unfilled] [FreeTextEntry1] : RH gentleman who is here for follow up from his stroke. \par Patient has a h/o atrial fibrillation with initial low CHADS-VASC score and so was not on AC prior to his stroke. presented with an acute left MCA stroke s/p tpa and  thrombectomy. He has done well post stroke with almost complete recovery except for occasional word paraphasic errors and mild gait difficulties. \par \par Found to be in persistent afib. \par \par Currently on Eliquis, Metoprolol and Lipitor \par Lipitor currently at 80 mg daily. Patient feels like its sore. Including his shoulders.\par \par Patient has started PT and OT and speech and has responded well. \par \par Echo: dilated left Atrium\par \par  \par

## 2021-05-03 NOTE — ASSESSMENT
[FreeTextEntry1] : patient will continue eliquis for afib and lipitor for post stroke statin. Due to his muscle aches will decrease lipitor to 40 mg and see if that improves his pain. \par \par Plan will be to follow up with cardiology and then will refer to Dr. Christianson for watchman device. Patient is concerned about being on AC long term since he is very active and enjoys skiing and other active sports

## 2021-06-23 PROCEDURE — 97116 GAIT TRAINING THERAPY: CPT

## 2021-06-23 PROCEDURE — 76000 FLUOROSCOPY <1 HR PHYS/QHP: CPT

## 2021-06-23 PROCEDURE — C1713: CPT

## 2021-06-23 PROCEDURE — 86901 BLOOD TYPING SEROLOGIC RH(D): CPT

## 2021-06-23 PROCEDURE — 36415 COLL VENOUS BLD VENIPUNCTURE: CPT

## 2021-06-23 PROCEDURE — 86803 HEPATITIS C AB TEST: CPT

## 2021-06-23 PROCEDURE — C1889: CPT

## 2021-06-23 PROCEDURE — 97530 THERAPEUTIC ACTIVITIES: CPT

## 2021-06-23 PROCEDURE — 86850 RBC ANTIBODY SCREEN: CPT

## 2021-06-23 PROCEDURE — 85025 COMPLETE CBC W/AUTO DIFF WBC: CPT

## 2021-06-23 PROCEDURE — 80048 BASIC METABOLIC PNL TOTAL CA: CPT

## 2021-06-23 PROCEDURE — 72100 X-RAY EXAM L-S SPINE 2/3 VWS: CPT

## 2021-06-23 PROCEDURE — 86769 SARS-COV-2 COVID-19 ANTIBODY: CPT

## 2021-06-23 PROCEDURE — 97161 PT EVAL LOW COMPLEX 20 MIN: CPT

## 2021-06-23 PROCEDURE — 86900 BLOOD TYPING SEROLOGIC ABO: CPT

## 2021-07-01 ENCOUNTER — APPOINTMENT (OUTPATIENT)
Dept: NEUROLOGY | Facility: CLINIC | Age: 66
End: 2021-07-01
Payer: COMMERCIAL

## 2021-07-01 VITALS
TEMPERATURE: 98.3 F | WEIGHT: 160 LBS | OXYGEN SATURATION: 99 % | HEIGHT: 69 IN | DIASTOLIC BLOOD PRESSURE: 73 MMHG | HEART RATE: 99 BPM | SYSTOLIC BLOOD PRESSURE: 105 MMHG | RESPIRATION RATE: 16 BRPM | BODY MASS INDEX: 23.7 KG/M2

## 2021-07-01 PROCEDURE — 99214 OFFICE O/P EST MOD 30 MIN: CPT

## 2021-07-01 PROCEDURE — 99072 ADDL SUPL MATRL&STAF TM PHE: CPT

## 2021-07-25 NOTE — HISTORY OF PRESENT ILLNESS
[FreeTextEntry1] : Pt is 64yo RH gentleman who is here for follow up from his stroke-  left MCA stroke s/p tpa and thrombectomy. \par Patient has a h/o atrial fibrillation with initial low CHADS-VASC score and so was not on AC prior to his stroke.  He has done well post stroke with almost complete recovery except for occasional word paraphasic errors and mild gait difficulties. P\par \par t's wife also reports his voice remains softer than prior to stroke and persistent slight drooping of the lip. Pt reports he is feeling ~90% return to baseline today. He does a combination of walking and running 25hrs/wk. He no longer needs to nap during the day. \par \par Currently on Eliquis, Metoprolol and Lipitor \par Lipitor 40mg daily- denies cramps/ muscle pain \par Eliquis- denies blood in urine or stool \par \par Patient responded well to PT/OT but stopped \par \par BP well-controlled 105/73 in office today \par

## 2021-07-25 NOTE — ASSESSMENT
[FreeTextEntry1] : Plan \par \par #1 CVA\par - Continue eliquis 5mg daily. Monitor for any blood in urine or stool. \par - Continue Atorvastatin 40mg daily. Monitor for any cramps or muscle pain. Goal LDL <70. \par - start elocution exercises to increase amplitude of voice\par - start focused PT for possible subluxation of R shoulder\par - start heel walking exercises to build strength of L foot dorsiflexion and exaggerated swinging of arms during walk\par \par #2 Stroke risk factors and lifestyle modification \par Afib- continue metoprolol. Defer to cards for management \par - pt to see cards in July for SHASHI, 5 day Holter monitoring prior to ablation\par -Counselled on healthy eating (DASH/ Mediterranean diet, limiting red meats)\par -Counselled on importance of regular exercise and remaining active\par -Continue to f/u with PCP regarding regular health maintenance and prevention, including routine screening \par \par -Counselled on signs of stroke BEFAST and to call 911 with any new or worsening neurological symptoms \par \par Will f/u with pt and scheduled carotid ultrasound in 3 months or 1 month after cardiac ablation (planned for July)\par \par

## 2021-10-06 ENCOUNTER — APPOINTMENT (OUTPATIENT)
Dept: NEUROLOGY | Facility: CLINIC | Age: 66
End: 2021-10-06

## 2021-10-19 ENCOUNTER — OUTPATIENT (OUTPATIENT)
Dept: OUTPATIENT SERVICES | Facility: HOSPITAL | Age: 66
LOS: 1 days | End: 2021-10-19
Payer: COMMERCIAL

## 2021-10-19 ENCOUNTER — APPOINTMENT (OUTPATIENT)
Dept: ULTRASOUND IMAGING | Facility: HOSPITAL | Age: 66
End: 2021-10-19

## 2021-10-19 ENCOUNTER — NON-APPOINTMENT (OUTPATIENT)
Age: 66
End: 2021-10-19

## 2021-10-19 ENCOUNTER — APPOINTMENT (OUTPATIENT)
Dept: NEUROLOGY | Facility: CLINIC | Age: 66
End: 2021-10-19
Payer: COMMERCIAL

## 2021-10-19 VITALS
HEIGHT: 69 IN | BODY MASS INDEX: 23.85 KG/M2 | TEMPERATURE: 98.4 F | HEART RATE: 84 BPM | SYSTOLIC BLOOD PRESSURE: 106 MMHG | DIASTOLIC BLOOD PRESSURE: 72 MMHG | OXYGEN SATURATION: 96 % | WEIGHT: 161 LBS | RESPIRATION RATE: 16 BRPM

## 2021-10-19 DIAGNOSIS — Z90.89 ACQUIRED ABSENCE OF OTHER ORGANS: Chronic | ICD-10-CM

## 2021-10-19 DIAGNOSIS — S43.002A UNSPECIFIED SUBLUXATION OF LEFT SHOULDER JOINT, INITIAL ENCOUNTER: ICD-10-CM

## 2021-10-19 DIAGNOSIS — Z98.890 OTHER SPECIFIED POSTPROCEDURAL STATES: Chronic | ICD-10-CM

## 2021-10-19 DIAGNOSIS — K40.20 BILATERAL INGUINAL HERNIA, WITHOUT OBSTRUCTION OR GANGRENE, NOT SPECIFIED AS RECURRENT: Chronic | ICD-10-CM

## 2021-10-19 PROCEDURE — 99214 OFFICE O/P EST MOD 30 MIN: CPT

## 2021-10-19 PROCEDURE — 93880 EXTRACRANIAL BILAT STUDY: CPT | Mod: 26

## 2021-11-04 NOTE — ASSESSMENT
[FreeTextEntry1] : Plan \par \par #1 CVA\par - Continue eliquis 5mg daily. Monitor for any blood in urine or stool. \par - Continue Atorvastatin 40mg daily. Monitor for any cramps or muscle pain. Goal LDL <70. \par \par #2 Stroke risk factors and lifestyle modification \par Afib- continue metoprolol. Defer to cards for management \par -Counselled on healthy eating (DASH/ Mediterranean diet, limiting red meats)\par -Counselled on importance of regular exercise and remaining active\par -Continue to f/u with PCP regarding regular health maintenance and prevention, including routine screening \par \par -Counselled on signs of stroke BEFAST and to call 911 with any new or worsening neurological symptoms \par \par \par \par

## 2021-11-04 NOTE — HISTORY OF PRESENT ILLNESS
[FreeTextEntry1] : Had an ablation 2 weeks ago and is doing well. no recurrence of afib \par energy back\par personality is back. \par cognitive function\par \par \par \par hpi:Pt is 66yo RH gentleman who is here for follow up from his stroke-  left MCA stroke s/p tpa and thrombectomy. \par Patient has a h/o atrial fibrillation with initial low CHADS-VASC score and so was not on AC prior to his stroke.  He has done well post stroke with almost complete recovery except for occasional word paraphasic errors and mild gait difficulties. P\par \par t's wife also reports his voice remains softer than prior to stroke and persistent slight drooping of the lip. Pt reports he is feeling ~90% return to baseline today. He does a combination of walking and running 25hrs/wk. He no longer needs to nap during the day. \par \par Currently on Eliquis, Metoprolol and Lipitor \par Lipitor 40mg daily- denies cramps/ muscle pain \par Eliquis- denies blood in urine or stool \par \par Patient responded well to PT/OT but stopped \par \par BP well-controlled 105/73 in office today \par

## 2022-03-03 PROBLEM — L85.9 EPIDERMAL THICKENING, UNSPECIFIED: Chronic | Status: ACTIVE | Noted: 2021-04-12

## 2022-03-03 PROBLEM — I10 ESSENTIAL (PRIMARY) HYPERTENSION: Chronic | Status: ACTIVE | Noted: 2021-04-12

## 2022-04-11 ENCOUNTER — APPOINTMENT (OUTPATIENT)
Dept: NEUROLOGY | Facility: CLINIC | Age: 67
End: 2022-04-11
Payer: COMMERCIAL

## 2022-04-11 VITALS
HEIGHT: 69 IN | TEMPERATURE: 97 F | DIASTOLIC BLOOD PRESSURE: 78 MMHG | SYSTOLIC BLOOD PRESSURE: 110 MMHG | OXYGEN SATURATION: 97 % | WEIGHT: 165 LBS | BODY MASS INDEX: 24.44 KG/M2 | HEART RATE: 78 BPM

## 2022-04-11 DIAGNOSIS — I48.0 PAROXYSMAL ATRIAL FIBRILLATION: ICD-10-CM

## 2022-04-11 DIAGNOSIS — I63.9 CEREBRAL INFARCTION, UNSPECIFIED: ICD-10-CM

## 2022-04-11 PROCEDURE — 99214 OFFICE O/P EST MOD 30 MIN: CPT

## 2022-04-11 RX ORDER — APIXABAN 5 MG/1
5 TABLET, FILM COATED ORAL
Refills: 0 | Status: ACTIVE | COMMUNITY

## 2022-04-11 RX ORDER — ATORVASTATIN CALCIUM 40 MG/1
40 TABLET, FILM COATED ORAL DAILY
Qty: 90 | Refills: 3 | Status: ACTIVE | COMMUNITY
Start: 1900-01-01 | End: 1900-01-01

## 2022-04-11 RX ORDER — METOPROLOL SUCCINATE 25 MG/1
25 CAPSULE, EXTENDED RELEASE ORAL
Qty: 30 | Refills: 0 | Status: ACTIVE | COMMUNITY
Start: 2021-06-03

## 2022-04-11 RX ORDER — SILODOSIN 4 MG/1
4 CAPSULE ORAL DAILY
Refills: 0 | Status: ACTIVE | COMMUNITY

## 2022-04-11 RX ORDER — METOPROLOL TARTRATE 75 MG/1
TABLET, FILM COATED ORAL
Refills: 0 | Status: DISCONTINUED | COMMUNITY
End: 2022-04-11

## 2022-04-11 NOTE — HISTORY OF PRESENT ILLNESS
[FreeTextEntry1] : Interval course: Post ablation \par followed by a sports cardiologist \par no further afib based on 2 periods \par \par carotid doppler last year ok. \par \par \par reduce benign prostate mass to imp[rove bladder emptying.  \par \par steroid intra-articular for left frozen shoulder that is improved with further PT. \par \par \par \par HPI: Had an ablation 2 weeks ago and is doing well. no recurrence of afib \par energy back\par personality is back. \par cognitive function\par \par \par \par hpi:Pt is 66yo RH gentleman who is here for follow up from his stroke-  left MCA stroke s/p tpa and thrombectomy. \par Patient has a h/o atrial fibrillation with initial low CHADS-VASC score and so was not on AC prior to his stroke.  He has done well post stroke with almost complete recovery except for occasional word paraphasic errors and mild gait difficulties. P\par \par t's wife also reports his voice remains softer than prior to stroke and persistent slight drooping of the lip. Pt reports he is feeling ~90% return to baseline today. He does a combination of walking and running 25hrs/wk. He no longer needs to nap during the day. \par \par Currently on Eliquis, Metoprolol and Lipitor \par Lipitor 40mg daily- denies cramps/ muscle pain \par Eliquis- denies blood in urine or stool \par \par Patient responded well to PT/OT but stopped \par \par BP well-controlled 105/73 in office today \par

## 2022-04-11 NOTE — ASSESSMENT
[FreeTextEntry1] : Plan \par \par #1 CVA\par - Continue eliquis 5mg daily. Monitor for any blood in urine or stool. \par - Continue Atorvastatin 40mg daily. Monitor for any cramps or muscle pain. Goal LDL <70. \par follow cramps - if they continue will consider changing statin. \par \par #2 Stroke risk factors and lifestyle modification \par Afib\par -Counselled on healthy eating (DASH/ Mediterranean diet, limiting red meats)\par -Counselled on importance of regular exercise and remaining active\par -Continue to f/u with PCP regarding regular health maintenance and prevention, including routine screening \par \par -Counselled on signs of stroke BEFAST and to call 911 with any new or worsening neurological symptoms \par \par \par \par

## 2022-04-11 NOTE — PHYSICAL EXAM
[FreeTextEntry1] : The patient is alert and oriented x3, naming intact x3, repetition normal, follows three-step commands, and is able to participate fully in the history taking.\par Speech is normal with no evidence of dysarthria.\par Memory is intact: Immediate recall 3 out of 3, short-term 3 out of 3, remote memory intact\par Cranial nerves II through XII intact\par Motor exam: Upper and lower extremities 5 out of 5 power, normal tone. No abnormal movements noted.\par Sensory exam: Intact to light touch and pinprick. Romberg negative.\par Coordination and vestibular exam: Finger to nose intact, no evidence of truncal or appendicular ataxia. No evidence of nystagmus. no vestibular symptoms elicited with head turning during ambulation.\par Gait: Normal stance and gait. able to stand on his toes and heels. n\par Reflexes: One to 2+ in upper and lower extremities. No pathological reflexes. Downgoing toes.\par

## 2022-04-23 NOTE — PHYSICAL THERAPY INITIAL EVALUATION ADULT - PHYSICAL ASSIST/NONPHYSICAL ASSIST: GAIT, REHAB EVAL
Problem: Pain:  Goal: Pain level will decrease  Description: Pain level will decrease  4/22/2022 2233 by Mac Carson LPN  Outcome: Progressing     Problem: Pain:  Goal: Control of acute pain  Description: Control of acute pain  4/22/2022 2233 by Mac Carson LPN  Outcome: Progressing     Problem: Falls - Risk of:  Goal: Will remain free from falls  Description: Will remain free from falls  4/22/2022 2233 by Mac Carson LPN  Outcome: Progressing     Problem: Falls - Risk of:  Goal: Absence of physical injury  Description: Absence of physical injury  4/22/2022 2233 by Mac Carson LPN  Outcome: Progressing verbal cues/nonverbal cues (demo/gestures)/1 person assist

## 2023-03-21 NOTE — OCCUPATIONAL THERAPY INITIAL EVALUATION ADULT - LIGHT TOUCH SENSATION, TRUNK, REHAB EVAL
Requested medication(s) are due for refill today: Yes  Patient has already received a courtesy refill: No  Other reason request has been forwarded to provider: Duplicate within normal limits

## 2023-07-14 ENCOUNTER — APPOINTMENT (OUTPATIENT)
Dept: NEUROLOGY | Facility: CLINIC | Age: 68
End: 2023-07-14

## 2023-11-04 NOTE — OCCUPATIONAL THERAPY INITIAL EVALUATION ADULT - PERTINENT HX OF CURRENT PROBLEM, REHAB EVAL
Medical Assessment Completed on: 04-Nov-2023 03:58
Imaging showing high attenuating thrombus within the right M1 segment on the noncontrast CT head, upright occlusion of the right M1 branch on the CTA head and neck, and perfusion defect in the R MCA territory. Post-TPA CTH on 3/30, no evidence of hemorrhagic transformation, well demarcated R MCA territory infarct present

## 2024-04-03 RX ORDER — METOPROLOL TARTRATE 50 MG
1 TABLET ORAL
Qty: 0 | Refills: 0 | DISCHARGE

## 2024-06-05 PROBLEM — N40.0 BENIGN PROSTATIC HYPERPLASIA WITHOUT LOWER URINARY TRACT SYMPTOMS: Chronic | Status: ACTIVE | Noted: 2021-03-29

## 2024-06-05 PROBLEM — I48.91 UNSPECIFIED ATRIAL FIBRILLATION: Chronic | Status: ACTIVE | Noted: 2021-03-29

## 2024-10-17 ENCOUNTER — APPOINTMENT (OUTPATIENT)
Dept: NEUROLOGY | Facility: CLINIC | Age: 69
End: 2024-10-17